# Patient Record
Sex: FEMALE | Race: WHITE | NOT HISPANIC OR LATINO | Employment: FULL TIME | URBAN - NONMETROPOLITAN AREA
[De-identification: names, ages, dates, MRNs, and addresses within clinical notes are randomized per-mention and may not be internally consistent; named-entity substitution may affect disease eponyms.]

---

## 2017-07-27 ENCOUNTER — APPOINTMENT (OUTPATIENT)
Dept: OCCUPATIONAL MEDICINE | Facility: OTHER | Age: 52
End: 2017-07-27

## 2017-07-27 PROCEDURE — 92552 PURE TONE AUDIOMETRY AIR: CPT

## 2020-07-14 ENCOUNTER — HOSPITAL ENCOUNTER (EMERGENCY)
Facility: HOSPITAL | Age: 55
End: 2020-07-14
Payer: COMMERCIAL

## 2020-07-14 ENCOUNTER — HOSPITAL ENCOUNTER (EMERGENCY)
Facility: HOSPITAL | Age: 55
Discharge: HOME | End: 2020-07-14
Attending: NURSE PRACTITIONER | Admitting: NURSE PRACTITIONER
Payer: COMMERCIAL

## 2020-07-14 VITALS
TEMPERATURE: 98 F | SYSTOLIC BLOOD PRESSURE: 190 MMHG | DIASTOLIC BLOOD PRESSURE: 107 MMHG | RESPIRATION RATE: 20 BRPM | OXYGEN SATURATION: 97 %

## 2020-07-14 DIAGNOSIS — K04.7 DENTAL ABSCESS: Primary | ICD-10-CM

## 2020-07-14 PROCEDURE — G0463 HOSPITAL OUTPT CLINIC VISIT: HCPCS

## 2020-07-14 PROCEDURE — 99203 OFFICE O/P NEW LOW 30 MIN: CPT | Mod: Z6 | Performed by: NURSE PRACTITIONER

## 2020-07-14 ASSESSMENT — ENCOUNTER SYMPTOMS: FACIAL SWELLING: 1

## 2020-07-14 NOTE — ED TRIAGE NOTES
"Pt is here with c/o dental pain \" I  have an abscess tooth\"  Onset 2 days   pt has not tried calling any dentist \" I g=figured Id get the abx and then get in there\"   ibu throught the days of only 400mg   Last dose 3 pm today   "

## 2020-07-14 NOTE — DISCHARGE INSTRUCTIONS
Take antibiotic as prescribed until finished.  Do salt water rinses and continue taking ibuprofen or Tylenol as needed for pain.  Apply cold compresses to the affected side of your face.    Schedule an appointment with a dentist as soon as possible for evaluation of fatigue.    Return to emergency department for worsening concerning symptoms.

## 2020-07-14 NOTE — ED AVS SNAPSHOT
[facility]  [address]  [place] [address]  Phone:  [phone]                                    Nhung Conroy   MRN: [id number]    Department:  [facility]   Date of Visit:  7/14/2020           After Visit Summary Signature Page    I have received my discharge instructions, and my questions have been answered. I have discussed any challenges I see with this plan with the nurse or doctor.    ..........................................................................................................................................  Patient/Patient Representative Signature      ..........................................................................................................................................  Patient Representative Print Name and Relationship to Patient    ..................................................               ................................................  Date                                   Time    ..........................................................................................................................................  Reviewed by Signature/Title    ...................................................              ..............................................  Date                                               Time          22EPIC Rev 08/18

## 2020-07-14 NOTE — ED PROVIDER NOTES
History     Chief Complaint   Patient presents with     Dental Pain     Bottom right     HPI  Nhung Conroy is a 55 year old female who presents to  for dental pain. Her right lower tooth cracked 1 week ago and noticed swelling to the right side of her face today.  Denies fever, nausea or vomiting. Swallowing okay.  She has been drinking more fluids and has tried ibuprofen which helps with the pain. Pain is 6/10. Last took ibuprofen around 1500.  She has not tried to contact the dentist yet.    Allergies:  No Known Allergies    Problem List:    There are no active problems to display for this patient.       Past Medical History:    History reviewed. No pertinent past medical history.    Past Surgical History:    History reviewed. No pertinent surgical history.    Family History:    No family history on file.    Social History:  Marital Status:   [2]  Social History     Tobacco Use     Smoking status: None   Substance Use Topics     Alcohol use: None     Drug use: None        Medications:    amoxicillin-clavulanate (AUGMENTIN) 875-125 MG tablet          Review of Systems   HENT: Positive for dental problem and facial swelling.    All other systems reviewed and are negative.      Physical Exam   BP: (!) 190/107  Heart Rate: 97  Temp: 98  F (36.7  C)  Resp: 20  SpO2: 97 %      Physical Exam  Vitals signs and nursing note reviewed.   Constitutional:       Appearance: Normal appearance. She is not ill-appearing or toxic-appearing.   HENT:      Head: Normocephalic.      Mouth/Throat:      Mouth: Mucous membranes are moist.      Dentition: Dental tenderness, gingival swelling and dental abscesses present.      Pharynx: Oropharynx is clear. Uvula midline.        Comments: Broken tooth #28. Right jaw swelling. No trismus.  Eyes:      Pupils: Pupils are equal, round, and reactive to light.   Neck:      Musculoskeletal: Neck supple.   Cardiovascular:      Rate and Rhythm: Normal rate and regular rhythm.       Heart sounds: Normal heart sounds.   Pulmonary:      Effort: Pulmonary effort is normal.      Breath sounds: Normal breath sounds.   Skin:     General: Skin is warm and dry.      Capillary Refill: Capillary refill takes less than 2 seconds.   Neurological:      Mental Status: She is alert and oriented to person, place, and time.         ED Course        Procedures               No results found for this or any previous visit (from the past 24 hour(s)).    Medications - No data to display    Assessments & Plan (with Medical Decision Making)   Patient has a right lower tooth that broke about a week ago with right facial swelling that developed today.  She does have dental tenderness, gingival swelling and broken tooth #28 along with a dental abscess.  No trismus.  Vital signs are stable.  Patient declines any pain medication.  Will prescribe Augmentin for the abscess.  Advised patient to do salt water gargles, take ibuprofen alternating with Tylenol and apply cold compresses to the affected side of the face.  Schedule an appointment with a dentist for further evaluation.  Return to emergency department for worsening concerning symptoms.  Patient verbalized understanding.    I have reviewed the nursing notes.    I have reviewed the findings, diagnosis, plan and need for follow up with the patient.      Discharge Medication List as of 7/14/2020  5:55 PM      START taking these medications    Details   amoxicillin-clavulanate (AUGMENTIN) 875-125 MG tablet Take 1 tablet by mouth 2 times daily for 7 days, Disp-14 tablet,R-0, E-Prescribe             Final diagnoses:   Dental abscess       7/14/2020   [facility]     Mpofu, Prudence, CNP  07/15/20 1507

## 2020-11-02 ENCOUNTER — NURSE TRIAGE (OUTPATIENT)
Dept: FAMILY MEDICINE | Facility: OTHER | Age: 55
End: 2020-11-02

## 2020-11-02 NOTE — TELEPHONE ENCOUNTER
Pt called, requesting covid testing. Pt is not experiencing symptoms and was not in direct contact with anyone with a confirmed positive for covid. Coworker tested positive though she is unsure which coworker. Was not within 6 feet of any coworkers and was wearing mask at all times. Pt informed that she does not meet criteria for testing at this time. Call back with symptoms or direct exposure. Pt verbalizes understanding.

## 2024-04-12 ENCOUNTER — HOSPITAL ENCOUNTER (EMERGENCY)
Facility: HOSPITAL | Age: 59
Discharge: HOME | End: 2024-04-12
Attending: NURSE PRACTITIONER | Admitting: NURSE PRACTITIONER
Payer: COMMERCIAL

## 2024-04-12 VITALS
DIASTOLIC BLOOD PRESSURE: 100 MMHG | HEART RATE: 91 BPM | SYSTOLIC BLOOD PRESSURE: 207 MMHG | OXYGEN SATURATION: 95 % | WEIGHT: 239 LBS | TEMPERATURE: 98.2 F | RESPIRATION RATE: 18 BRPM

## 2024-04-12 DIAGNOSIS — I10 ELEVATED BLOOD PRESSURE READING WITH DIAGNOSIS OF HYPERTENSION: ICD-10-CM

## 2024-04-12 LAB
ANION GAP SERPL CALCULATED.3IONS-SCNC: 9 MMOL/L (ref 7–15)
BASOPHILS # BLD AUTO: 0.1 10E3/UL (ref 0–0.2)
BASOPHILS NFR BLD AUTO: 1 %
BUN SERPL-MCNC: 14.7 MG/DL (ref 8–23)
CALCIUM SERPL-MCNC: 9.9 MG/DL (ref 8.8–10.2)
CHLORIDE SERPL-SCNC: 103 MMOL/L (ref 98–107)
CREAT SERPL-MCNC: 0.64 MG/DL (ref 0.51–0.95)
DEPRECATED HCO3 PLAS-SCNC: 27 MMOL/L (ref 22–29)
EGFRCR SERPLBLD CKD-EPI 2021: >90 ML/MIN/1.73M2
EOSINOPHIL # BLD AUTO: 0.1 10E3/UL (ref 0–0.7)
EOSINOPHIL NFR BLD AUTO: 1 %
ERYTHROCYTE [DISTWIDTH] IN BLOOD BY AUTOMATED COUNT: 13.9 % (ref 10–15)
GLUCOSE SERPL-MCNC: 101 MG/DL (ref 70–99)
HCT VFR BLD AUTO: 45.5 % (ref 35–47)
HGB BLD-MCNC: 15 G/DL (ref 11.7–15.7)
HOLD SPECIMEN: NORMAL
IMM GRANULOCYTES # BLD: 0 10E3/UL
IMM GRANULOCYTES NFR BLD: 0 %
LYMPHOCYTES # BLD AUTO: 1.5 10E3/UL (ref 0.8–5.3)
LYMPHOCYTES NFR BLD AUTO: 18 %
MCH RBC QN AUTO: 29.1 PG (ref 26.5–33)
MCHC RBC AUTO-ENTMCNC: 33 G/DL (ref 31.5–36.5)
MCV RBC AUTO: 88 FL (ref 78–100)
MONOCYTES # BLD AUTO: 0.6 10E3/UL (ref 0–1.3)
MONOCYTES NFR BLD AUTO: 7 %
NEUTROPHILS # BLD AUTO: 6 10E3/UL (ref 1.6–8.3)
NEUTROPHILS NFR BLD AUTO: 73 %
NRBC # BLD AUTO: 0 10E3/UL
NRBC BLD AUTO-RTO: 0 /100
PLATELET # BLD AUTO: 270 10E3/UL (ref 150–450)
POTASSIUM SERPL-SCNC: 4.5 MMOL/L (ref 3.4–5.3)
RBC # BLD AUTO: 5.15 10E6/UL (ref 3.8–5.2)
SODIUM SERPL-SCNC: 139 MMOL/L (ref 135–145)
TROPONIN T SERPL HS-MCNC: 9 NG/L
WBC # BLD AUTO: 8.3 10E3/UL (ref 4–11)

## 2024-04-12 PROCEDURE — 80048 BASIC METABOLIC PNL TOTAL CA: CPT | Performed by: NURSE PRACTITIONER

## 2024-04-12 PROCEDURE — 93010 ELECTROCARDIOGRAM REPORT: CPT | Performed by: INTERNAL MEDICINE

## 2024-04-12 PROCEDURE — 99284 EMERGENCY DEPT VISIT MOD MDM: CPT

## 2024-04-12 PROCEDURE — 85025 COMPLETE CBC W/AUTO DIFF WBC: CPT | Performed by: NURSE PRACTITIONER

## 2024-04-12 PROCEDURE — 93005 ELECTROCARDIOGRAM TRACING: CPT

## 2024-04-12 PROCEDURE — 99284 EMERGENCY DEPT VISIT MOD MDM: CPT | Performed by: NURSE PRACTITIONER

## 2024-04-12 PROCEDURE — 84484 ASSAY OF TROPONIN QUANT: CPT | Performed by: NURSE PRACTITIONER

## 2024-04-12 PROCEDURE — 36415 COLL VENOUS BLD VENIPUNCTURE: CPT | Performed by: NURSE PRACTITIONER

## 2024-04-12 ASSESSMENT — COLUMBIA-SUICIDE SEVERITY RATING SCALE - C-SSRS
2. HAVE YOU ACTUALLY HAD ANY THOUGHTS OF KILLING YOURSELF IN THE PAST MONTH?: NO
1. IN THE PAST MONTH, HAVE YOU WISHED YOU WERE DEAD OR WISHED YOU COULD GO TO SLEEP AND NOT WAKE UP?: NO
6. HAVE YOU EVER DONE ANYTHING, STARTED TO DO ANYTHING, OR PREPARED TO DO ANYTHING TO END YOUR LIFE?: NO

## 2024-04-12 NOTE — ED TRIAGE NOTES
Patient presents after being seen in the eye clinic and was told she has some inflammation of her cranial nerves. She states they took her B/P and it was high and she was directed to come here. Patient has no complaints, she's not dizzy, light headed, no balance issues. She takes no meds and does not see primary, states it's been years.

## 2024-04-12 NOTE — DISCHARGE INSTRUCTIONS
Follow-up with your primary care provider for reevaluation.  Contact your primary care provider if you have any questions or concerns.  Do not hesitate to return to the ER if any new or worsening symptoms.     Please read the attached instructions (if any).  They highlight more specific treatments and interventions for you at home.              Thank you for letting me participate in your care and wish you a fast and uneventful recovery,    Narinder LANG CNP    Do not hesitate to contact me with questions or concerns.  [email or web address]  [email or web address]

## 2024-04-12 NOTE — ED NOTES
Pt discharged home. Pt declined any headache, chest pain or pressure. Pt education given. Pt verbalized understanding of discharge instructions.

## 2024-04-12 NOTE — ED PROVIDER NOTES
History     Chief Complaint   Patient presents with    Hypertension     HPI  Nhung Conroy is a 58 year old individual is sent over from my clinic due to elevated blood pressure.  Patient  was seen at ophthalmology today and they took her blood pressure is elevated.  Was advised to go to the ER for evaluation.  Patient  is not having any symptoms.  No headache, visual disturbances, chest pain, shortness of breath, paresthesias, or weaknesses.   is feeling great.   does have history of high blood pressure at office visits in the past but has never taken any medication for it.    Allergies:  No Known Allergies    Problem List:    There are no problems to display for this patient.       Past Medical History:    History reviewed. No pertinent past medical history.    Past Surgical History:    History reviewed. No pertinent surgical history.    Family History:    History reviewed. No pertinent family history.    Social History:  Marital Status:   [2]  Social History     Tobacco Use    Smoking status: Every Day     Current packs/day: 1.00     Average packs/day: 1 pack/day for 30.1 years (30.1 ttl pk-yrs)     Types: Cigarettes     Start date: 3/7/1994    Smokeless tobacco: Never   Substance Use Topics    Alcohol use: Yes     Comment: monthly    Drug use: Never        Medications:    No current outpatient medications on file.        Review of Systems    Physical Exam   BP: (!) 207/100  Pulse: 91  Temp: 98.2  F (36.8  C)  Resp: 18  Weight: 108.4 kg (239 lb)  SpO2: 95 %      GENERAL APPEARANCE:  The patient is a 58 year old well-developed, well-nourished individual in no acute distress that appears as stated age.  NECK:  Supple.  Trachea is midline.  No carotid bruits present on auscultation.  LUNGS:  Breathing is easy.  Breath sounds are equal and clear bilaterally.  No wheezes, rhonchi, or rales.  HEART:  Regular rate and rhythm with normal S1 and S2.  No murmurs, gallops, or  rubs.  ABDOMEN:  No abdominal bruits or thrills present upon auscultation/palpation.  EXTREMITIES:  No cyanosis, clubbing, or edema.    NEUROLOGIC:  No focal sensory or motor deficits are noted.  Gait is normal.  Cranial nerves II through XII are intact.  Deep tendon reflexes are intact.  PSYCHIATRIC:  The patient is awake, alert, and oriented x4.  Recent and remote memory is intact.  Appropriate mood and affect.  Calm and cooperative with history and physical exam.  SKIN:  Warm, dry, and well perfused.  Good turgor.  No lesions, nodules, or rashes are noted.  No bruising noted.      Comment: Discrepancies between my note and notes on behalf of the nursing team or other care providers are secondary to my findings reflecting my physical examination and questioning of the patient.  Any conflicting information provided is not in line with my examination of the patient.       ED Course     ED Course as of 04/12/24 1748 Fri Apr 12, 2024   1347 Labs and ECG ordered while in [facility].   1743 EKG 12-lead, tracing only  No STEMI noted.   1743 Labs show no endorgan damage.  ECG normal.  Patient does have elevated blood pressure.  Will discharge patient home to follow-up with PCP in regards to blood pressure.  Return to ER if new or worsening symptoms do occur.  Patient in agreement.            ECG:    ECG competed at 1740 and personally reviewed at 1743 showing sinus rhythm with ventricular rate of 82 and QTc of 427.  Normal axis.  Normal ECG.  No previous ECG available for comparison.         Results for orders placed or performed during the hospital encounter of 04/12/24 (from the past 24 hour(s))   Basic metabolic panel   Result Value Ref Range    Sodium 139 135 - 145 mmol/L    Potassium 4.5 3.4 - 5.3 mmol/L    Chloride 103 98 - 107 mmol/L    Carbon Dioxide (CO2) 27 22 - 29 mmol/L    Anion Gap 9 7 - 15 mmol/L    Urea Nitrogen 14.7 8.0 - 23.0 mg/dL    Creatinine 0.64 0.51 - 0.95 mg/dL    GFR Estimate >90 >60 mL/min/1.73m2     Calcium 9.9 8.8 - 10.2 mg/dL    Glucose 101 (H) 70 - 99 mg/dL   CBC with platelets differential    Narrative    The following orders were created for panel order CBC with platelets differential.  Procedure                               Abnormality         Status                     ---------                               -----------         ------                     CBC with platelets and d...[[id number]]                      Final result               RBC and Platelet Morphology[[id number]]                                                   Please view results for these tests on the individual orders.   Troponin T, High Sensitivity   Result Value Ref Range    Troponin T, High Sensitivity 9 <=14 ng/L   CBC with platelets and differential   Result Value Ref Range    WBC Count 8.3 4.0 - 11.0 10e3/uL    RBC Count 5.15 3.80 - 5.20 10e6/uL    Hemoglobin 15.0 11.7 - 15.7 g/dL    Hematocrit 45.5 35.0 - 47.0 %    MCV 88 78 - 100 fL    MCH 29.1 26.5 - 33.0 pg    MCHC 33.0 31.5 - 36.5 g/dL    RDW 13.9 10.0 - 15.0 %    Platelet Count 270 150 - 450 10e3/uL    % Neutrophils 73 %    % Lymphocytes 18 %    % Monocytes 7 %    % Eosinophils 1 %    % Basophils 1 %    % Immature Granulocytes 0 %    NRBCs per 100 WBC 0 <1 /100    Absolute Neutrophils 6.0 1.6 - 8.3 10e3/uL    Absolute Lymphocytes 1.5 0.8 - 5.3 10e3/uL    Absolute Monocytes 0.6 0.0 - 1.3 10e3/uL    Absolute Eosinophils 0.1 0.0 - 0.7 10e3/uL    Absolute Basophils 0.1 0.0 - 0.2 10e3/uL    Absolute Immature Granulocytes 0.0 <=0.4 10e3/uL    Absolute NRBCs 0.0 10e3/uL   Extra Tube    Narrative    The following orders were created for panel order Extra Tube.  Procedure                               Abnormality         Status                     ---------                               -----------         ------                     Extra Blue Top Tube[[id number]]                              Final result               Extra Red Top Tube[[id number]]                                Final result               Extra Heparinized Syringe[[id number]]                        Final result                 Please view results for these tests on the individual orders.   Extra Blue Top Tube   Result Value Ref Range    Hold Specimen JIC    Extra Red Top Tube   Result Value Ref Range    Hold Specimen JIC    Extra Heparinized Syringe   Result Value Ref Range    Hold Specimen JIC    EKG 12-lead, tracing only   Result Value Ref Range    Systolic Blood Pressure  mmHg    Diastolic Blood Pressure  mmHg    Ventricular Rate 82 BPM    Atrial Rate 82 BPM    OH Interval 184 ms    QRS Duration 90 ms     ms    QTc 427 ms    P Axis 66 degrees    R AXIS 22 degrees    T Axis 69 degrees    Interpretation ECG       Sinus rhythm  Possible Left atrial enlargement  Borderline ECG  No previous ECGs available         Medications - No data to display    Assessments & Plan (with Medical Decision Making)     I have reviewed the nursing notes.    I have reviewed the findings, diagnosis, plan and need for follow up with the patient.      Summary:  Patient presents to the ER today for elevated blood pressure.  Potential diagnosis which have been considered and evaluated include hypertension, elevated blood pressure without diagnosis of hypertension, endorgan damage, as well as others. Many of these have been excluded using the various modalities and assessment as noted on the chart. At the present time, the diagnosis given seems to be the most likely blood pressure without diagnosis of hypertension.  Upon arrival, vitals signs show blood pressure 207/100 with a pulse of 91.  Temperature 36.8  C progress patient 16 laxation 95% on room air.  The patient is alert and oriented.  Neurological examination normal.  Cardiac and respiratory examination normal.  Patient has no symptoms.  ECG obtained showing no acute MIs.  Lab work obtained for checking of endorgan damage and shows WBC of 8.3 with hemoglobin of 15.0.  Electrolytes and  renal functions normal.  High-sensitivity troponin 9 making ACS unlikely.  At this time patient has elevated blood pressure reading without signs of endorgan damage.  Will discharge patient home to follow-up with PCP in regards to blood pressure control medication.  Return to ER if symptoms do occur such as chest pain, dizziness, lightheadedness, shortness of breath.  Patient verbalized understanding of plan of care.  Patient discharged home.        Critical Care Time: None    Impression and plan discussed with patient. Questions answered, concerns addressed, indications for urgent re-evaluation reviewed, and  given. Patient/Parent/Caregiver agree with treatment plan and have no further questions at this time.  AVS provided at discharge.    This note was created by the Dragon Voice Dictation System. Inadvertent typographical errors, due to software recognition problems, may still exist.             New Prescriptions    No medications on file       Final diagnoses:   Elevated blood pressure reading with diagnosis of hypertension       4/12/2024   [facility]       Narinder Martins APRN CNP  04/12/24 1827

## 2024-04-13 LAB
ATRIAL RATE - MUSE: 82 BPM
DIASTOLIC BLOOD PRESSURE - MUSE: NORMAL MMHG
INTERPRETATION ECG - MUSE: NORMAL
P AXIS - MUSE: 66 DEGREES
PR INTERVAL - MUSE: 184 MS
QRS DURATION - MUSE: 90 MS
QT - MUSE: 366 MS
QTC - MUSE: 427 MS
R AXIS - MUSE: 22 DEGREES
SYSTOLIC BLOOD PRESSURE - MUSE: NORMAL MMHG
T AXIS - MUSE: 69 DEGREES
VENTRICULAR RATE- MUSE: 82 BPM

## 2024-05-01 ENCOUNTER — OFFICE VISIT (OUTPATIENT)
Dept: FAMILY MEDICINE | Facility: OTHER | Age: 59
End: 2024-05-01
Attending: STUDENT IN AN ORGANIZED HEALTH CARE EDUCATION/TRAINING PROGRAM
Payer: COMMERCIAL

## 2024-05-01 ENCOUNTER — ANCILLARY PROCEDURE (OUTPATIENT)
Dept: GENERAL RADIOLOGY | Facility: OTHER | Age: 59
End: 2024-05-01
Attending: STUDENT IN AN ORGANIZED HEALTH CARE EDUCATION/TRAINING PROGRAM
Payer: COMMERCIAL

## 2024-05-01 VITALS
DIASTOLIC BLOOD PRESSURE: 90 MMHG | WEIGHT: 240.4 LBS | TEMPERATURE: 98 F | RESPIRATION RATE: 18 BRPM | BODY MASS INDEX: 42.59 KG/M2 | HEART RATE: 92 BPM | SYSTOLIC BLOOD PRESSURE: 180 MMHG | HEIGHT: 63 IN | OXYGEN SATURATION: 94 %

## 2024-05-01 DIAGNOSIS — M25.561 BILATERAL CHRONIC KNEE PAIN: ICD-10-CM

## 2024-05-01 DIAGNOSIS — G89.29 BILATERAL CHRONIC KNEE PAIN: ICD-10-CM

## 2024-05-01 DIAGNOSIS — H90.6 MIXED HEARING LOSS, BILATERAL: ICD-10-CM

## 2024-05-01 DIAGNOSIS — F17.210 CIGARETTE NICOTINE DEPENDENCE WITHOUT COMPLICATION: ICD-10-CM

## 2024-05-01 DIAGNOSIS — L24.5 IRRITANT CONTACT DERMATITIS DUE TO OTHER CHEMICAL PRODUCTS: ICD-10-CM

## 2024-05-01 DIAGNOSIS — I10 ESSENTIAL HYPERTENSION: ICD-10-CM

## 2024-05-01 DIAGNOSIS — M25.562 BILATERAL CHRONIC KNEE PAIN: ICD-10-CM

## 2024-05-01 DIAGNOSIS — Z76.89 ENCOUNTER TO ESTABLISH CARE: Primary | ICD-10-CM

## 2024-05-01 PROCEDURE — 99214 OFFICE O/P EST MOD 30 MIN: CPT | Performed by: STUDENT IN AN ORGANIZED HEALTH CARE EDUCATION/TRAINING PROGRAM

## 2024-05-01 PROCEDURE — 73562 X-RAY EXAM OF KNEE 3: CPT | Mod: TC | Performed by: RADIOLOGY

## 2024-05-01 RX ORDER — LISINOPRIL/HYDROCHLOROTHIAZIDE 10-12.5 MG
1 TABLET ORAL DAILY
Qty: 30 TABLET | Refills: 0 | Status: SHIPPED | OUTPATIENT
Start: 2024-05-01 | End: 2024-06-07

## 2024-05-01 RX ORDER — TRIAMCINOLONE ACETONIDE 1 MG/G
CREAM TOPICAL 2 TIMES DAILY
Qty: 30 G | Refills: 0 | Status: SHIPPED | OUTPATIENT
Start: 2024-05-01

## 2024-05-01 ASSESSMENT — ANXIETY QUESTIONNAIRES
GAD7 TOTAL SCORE: 12
1. FEELING NERVOUS, ANXIOUS, OR ON EDGE: MORE THAN HALF THE DAYS
5. BEING SO RESTLESS THAT IT IS HARD TO SIT STILL: NOT AT ALL
8. IF YOU CHECKED OFF ANY PROBLEMS, HOW DIFFICULT HAVE THESE MADE IT FOR YOU TO DO YOUR WORK, TAKE CARE OF THINGS AT HOME, OR GET ALONG WITH OTHER PEOPLE?: SOMEWHAT DIFFICULT
GAD7 TOTAL SCORE: 12
IF YOU CHECKED OFF ANY PROBLEMS ON THIS QUESTIONNAIRE, HOW DIFFICULT HAVE THESE PROBLEMS MADE IT FOR YOU TO DO YOUR WORK, TAKE CARE OF THINGS AT HOME, OR GET ALONG WITH OTHER PEOPLE: SOMEWHAT DIFFICULT
3. WORRYING TOO MUCH ABOUT DIFFERENT THINGS: NEARLY EVERY DAY
7. FEELING AFRAID AS IF SOMETHING AWFUL MIGHT HAPPEN: MORE THAN HALF THE DAYS
GAD7 TOTAL SCORE: 12
7. FEELING AFRAID AS IF SOMETHING AWFUL MIGHT HAPPEN: MORE THAN HALF THE DAYS
6. BECOMING EASILY ANNOYED OR IRRITABLE: SEVERAL DAYS
2. NOT BEING ABLE TO STOP OR CONTROL WORRYING: NEARLY EVERY DAY
4. TROUBLE RELAXING: SEVERAL DAYS

## 2024-05-01 ASSESSMENT — PAIN SCALES - GENERAL: PAINLEVEL: SEVERE PAIN (7)

## 2024-05-01 ASSESSMENT — PATIENT HEALTH QUESTIONNAIRE - PHQ9
SUM OF ALL RESPONSES TO PHQ QUESTIONS 1-9: 8
SUM OF ALL RESPONSES TO PHQ QUESTIONS 1-9: 8
10. IF YOU CHECKED OFF ANY PROBLEMS, HOW DIFFICULT HAVE THESE PROBLEMS MADE IT FOR YOU TO DO YOUR WORK, TAKE CARE OF THINGS AT HOME, OR GET ALONG WITH OTHER PEOPLE: SOMEWHAT DIFFICULT

## 2024-05-01 NOTE — PATIENT INSTRUCTIONS
Check blood pressure every morning and write it down  Bring blood pressure cuff and reading log to next visit  Watch sodium intake, limit to 2,000 mg a day  Try walking 30 minutes 5x a week

## 2024-05-01 NOTE — PROGRESS NOTES
Assessment & Plan     Encounter to establish care  Patient presents to clinic to establish care.  No PCP visit for several years.  Needs to come in to have labs updated.  Has uncontrolled HTN, first noted as bilateral papilledema which prompted referral to our clinic for further management of this.      Essential hypertension  HTN in severe range.  Will start with dual medication, low dose zestoretic.  Continue to monitor BP at home daily and bring log and BP machine to next visit  Discussed side effects of medicine.    Had ECG done, possible mild LAE- no exertional CP, SOB,palpitations, dizziness, lightheadedness, etc.   Reviewed DASH diet  Will benefit from weight loss.  Component of EMILE?  - lisinopril-hydrochlorothiazide (ZESTORETIC) 10-12.5 MG tablet; Take 1 tablet by mouth daily  - Home Blood Pressure Monitor Order for DME - ONLY FOR DME    Bilateral chronic knee pain  Most likely degenerative, will check XR first  May benefit from course of PT for strengthening  BMI of 42.58, weight contributing.  Recommend weight loss.  - XR Knee Right 3 Views (Clinic Performed); Future  - XR Knee Left 3 Views (Clinic Performed); Future    Irritant contact dermatitis due to other chemical products  Was doing crafts using resin epoxy and got exposed accidentally to the chemical on her arm and face- itchy and developing small blisters with scratching.   Bilateral forearms with scattered small papules on erythematous base  Will treat with triamcinolone cream BID  - triamcinolone (KENALOG) 0.1 % external cream; Apply topically 2 times daily Apply thin layer to rash only on bilateral forearms, hands and face twice daily for up to 14 days.    Mixed hearing loss, bilateral  - Adult Audiology  Referral; Future    Cigarette nicotine dependence without complication  Counseled on smoking cessation.  Will review in more detail at follow-up appointment.  Details not elucidated today      30 minutes spent by me on the date of  "the encounter doing chart review, history and exam, documentation and further activities per the note      Nicotine/Tobacco Cessation  She reports that she has been smoking cigarettes. She started smoking about 30 years ago. She has a 30.2 pack-year smoking history. She has never used smokeless tobacco.  Nicotine/Tobacco Cessation Plan  Self help information given to patient      BMI  Estimated body mass index is 42.58 kg/m  as calculated from the following:    Height as of this encounter: 1.6 m (5' 3\").    Weight as of this encounter: 109 kg (240 lb 6.4 oz).   Weight management plan: Discussed healthy diet and exercise guidelines      Return in about 2 weeks (around 5/15/2024).    Parris Hooker is a 58 year old, presenting for the following health issues:  Establish Care and Hypertension        5/1/2024     8:28 AM   Additional Questions   Roomed by Stefania Oscar LPN, CCP, MHP   Accompanied by self     History of Present Illness       Reason for visit:  High blood pressure  Symptom onset:  1-2 weeks ago    She eats 0-1 servings of fruits and vegetables daily.She consumes 0 sweetened beverage(s) daily.She exercises with enough effort to increase her heart rate 9 or less minutes per day.  She exercises with enough effort to increase her heart rate 3 or less days per week.   She is taking medications regularly.       ED/UC Followup:    Facility:  [facility]  Date of visit: 04/12/2024  Reason for visit: Hypertension  Current Status: has been checking it at home- patient can tell when bp is high she will get pressure In ears and optic nerves swell as per eye doctor     She tells me she had gone to eye doctor recently and her optic discs were noted to be swollen.  Was sent to the ER for evaluation.  Denies any headaches, vision changes, chest pain, SOB, palpitations, dizziness, lightheadedness, vision changes  Sometimes pressure in the ears and ringing.    Both parents had high blood pressure.  Has been told she has " "elevated BP for a while now but never did anything about it     Chronic knee pain.    Works at [employer] department at [employer]- feels that her knee pain is slowing her down a little bit.  Pain is waking her up at night.  She tells me her knees look swollen.  No other joint issues.      Right sided low back pain, radiating down posterior leg into knee and into ankle with walking.  Sitting helps. Weakness in the right leg thaty started last week, No foot drop.  No incontinence of stool or urine, no saddle anesthesia.      Was working with resin epoxy on Saturday- Had gloves and mask on but when cleaning up, she exposed her arms and face to it and has been itching it.        Review of Systems  Constitutional, HEENT, cardiovascular, pulmonary, GI, , musculoskeletal, neuro, skin, endocrine and psych systems are negative, except as otherwise noted.      Objective    BP (!) 180/90 (BP Location: Left arm, Patient Position: Sitting, Cuff Size: Adult Large)   Pulse 92   Temp 98  F (36.7  C) (Tympanic)   Resp 18   Ht 1.6 m (5' 3\")   Wt 109 kg (240 lb 6.4 oz)   SpO2 94%   BMI 42.58 kg/m    Body mass index is 42.58 kg/m .  Physical Exam   GENERAL: alert and no distress  EYES: Eyes grossly normal to inspection, PERRL and conjunctivae and sclerae normal  HENT: ear canals and TM's normal, nose and mouth without ulcers or lesions  NECK: no adenopathy, no asymmetry, masses, or scars  RESP: lungs clear to auscultation - no rales, rhonchi or wheezes  CV: regular rate and rhythm, normal S1 S2, no S3 or S4, no murmur, click or rub, no peripheral edema  ABDOMEN: soft, nontender, no hepatosplenomegaly, no masses and bowel sounds normal  MS: no gross musculoskeletal defects noted, no edema  SKIN: no suspicious lesions or rashes.  Palmar warts on bilateral palmar aspects of hand.    NEURO: Normal strength and tone, mentation intact and speech normal.  CN II- XII grossly intact.  Normal sensation, normal strength bilaterally, " Reflexes 2+ bilaterally  PSYCH: mentation appears normal, affect normal/bright          Signed Electronically by: Liv Ferrera MD

## 2024-05-02 ENCOUNTER — TELEPHONE (OUTPATIENT)
Dept: FAMILY MEDICINE | Facility: OTHER | Age: 59
End: 2024-05-02

## 2024-05-02 DIAGNOSIS — M25.562 BILATERAL CHRONIC KNEE PAIN: Primary | ICD-10-CM

## 2024-05-02 DIAGNOSIS — M25.561 BILATERAL CHRONIC KNEE PAIN: Primary | ICD-10-CM

## 2024-05-02 DIAGNOSIS — G89.29 BILATERAL CHRONIC KNEE PAIN: Primary | ICD-10-CM

## 2024-05-02 NOTE — TELEPHONE ENCOUNTER
Reason for call:  RESULTS    What is the test that was ordered? XR R knee  Who ordered your test? Dr Ferrera  When was the test performed?  5/1/24  Description: Patient requesting call back for results please    Preferred method for responding to this message: Telephone Call  What is your phone number ? [phone]    If we cannot reach you directly, may we leave a detailed response at the number you provided? Yes

## 2024-05-03 NOTE — TELEPHONE ENCOUNTER
Pt is agreeable to trying PT  Referral pended to PCP                Liv Ferrera MD  5/1/2024 10:24 AM CDT       There is some arthritis which is not severe in both knees.  Recommend course of PT for this for strengthening.  Can consider steroid injection once BP is well controlled.

## 2024-05-17 ENCOUNTER — OFFICE VISIT (OUTPATIENT)
Dept: FAMILY MEDICINE | Facility: OTHER | Age: 59
End: 2024-05-17
Attending: STUDENT IN AN ORGANIZED HEALTH CARE EDUCATION/TRAINING PROGRAM
Payer: COMMERCIAL

## 2024-05-17 ENCOUNTER — APPOINTMENT (OUTPATIENT)
Dept: LAB | Facility: OTHER | Age: 59
End: 2024-05-17
Payer: COMMERCIAL

## 2024-05-17 ENCOUNTER — ORDERS ONLY (AUTO-RELEASED) (OUTPATIENT)
Dept: FAMILY MEDICINE | Facility: OTHER | Age: 59
End: 2024-05-17

## 2024-05-17 VITALS
HEART RATE: 100 BPM | DIASTOLIC BLOOD PRESSURE: 81 MMHG | TEMPERATURE: 98.5 F | OXYGEN SATURATION: 96 % | RESPIRATION RATE: 18 BRPM | WEIGHT: 237.7 LBS | SYSTOLIC BLOOD PRESSURE: 132 MMHG | HEIGHT: 63 IN | BODY MASS INDEX: 42.12 KG/M2

## 2024-05-17 DIAGNOSIS — R60.0 BILATERAL LOWER EXTREMITY EDEMA: ICD-10-CM

## 2024-05-17 DIAGNOSIS — Z00.00 ROUTINE HISTORY AND PHYSICAL EXAMINATION OF ADULT: ICD-10-CM

## 2024-05-17 DIAGNOSIS — Z11.4 ENCOUNTER FOR SCREENING FOR HIV: ICD-10-CM

## 2024-05-17 DIAGNOSIS — F17.210 CIGARETTE NICOTINE DEPENDENCE WITHOUT COMPLICATION: ICD-10-CM

## 2024-05-17 DIAGNOSIS — Z12.31 ENCOUNTER FOR SCREENING MAMMOGRAM FOR BREAST CANCER: ICD-10-CM

## 2024-05-17 DIAGNOSIS — L73.2 HIDRADENITIS SUPPURATIVA: ICD-10-CM

## 2024-05-17 DIAGNOSIS — E55.9 VITAMIN D DEFICIENCY: ICD-10-CM

## 2024-05-17 DIAGNOSIS — G89.29 CHRONIC RIGHT-SIDED LOW BACK PAIN WITH RIGHT-SIDED SCIATICA: ICD-10-CM

## 2024-05-17 DIAGNOSIS — M17.0 OSTEOARTHRITIS OF BOTH KNEES, UNSPECIFIED OSTEOARTHRITIS TYPE: ICD-10-CM

## 2024-05-17 DIAGNOSIS — M54.41 CHRONIC RIGHT-SIDED LOW BACK PAIN WITH RIGHT-SIDED SCIATICA: ICD-10-CM

## 2024-05-17 DIAGNOSIS — E66.01 MORBID OBESITY (H): ICD-10-CM

## 2024-05-17 DIAGNOSIS — I10 ESSENTIAL HYPERTENSION: Primary | ICD-10-CM

## 2024-05-17 DIAGNOSIS — Z11.59 ENCOUNTER FOR HEPATITIS C SCREENING TEST FOR LOW RISK PATIENT: ICD-10-CM

## 2024-05-17 DIAGNOSIS — Z12.4 SCREENING FOR CERVICAL CANCER: ICD-10-CM

## 2024-05-17 LAB
ALBUMIN SERPL BCG-MCNC: 4.6 G/DL (ref 3.5–5.2)
ALP SERPL-CCNC: 86 U/L (ref 40–150)
ALT SERPL W P-5'-P-CCNC: 20 U/L (ref 0–50)
ANION GAP SERPL CALCULATED.3IONS-SCNC: 9 MMOL/L (ref 7–15)
AST SERPL W P-5'-P-CCNC: 17 U/L (ref 0–45)
BASOPHILS # BLD AUTO: 0 10E3/UL (ref 0–0.2)
BASOPHILS NFR BLD AUTO: 1 %
BILIRUB SERPL-MCNC: 0.5 MG/DL
BUN SERPL-MCNC: 23.1 MG/DL (ref 8–23)
CALCIUM SERPL-MCNC: 10.2 MG/DL (ref 8.8–10.2)
CHLORIDE SERPL-SCNC: 98 MMOL/L (ref 98–107)
CHOLEST SERPL-MCNC: 212 MG/DL
CREAT SERPL-MCNC: 0.69 MG/DL (ref 0.51–0.95)
CREAT UR-MCNC: 26.9 MG/DL
DEPRECATED HCO3 PLAS-SCNC: 28 MMOL/L (ref 22–29)
EGFRCR SERPLBLD CKD-EPI 2021: >90 ML/MIN/1.73M2
EOSINOPHIL # BLD AUTO: 0.1 10E3/UL (ref 0–0.7)
EOSINOPHIL NFR BLD AUTO: 1 %
ERYTHROCYTE [DISTWIDTH] IN BLOOD BY AUTOMATED COUNT: 14.2 % (ref 10–15)
EST. AVERAGE GLUCOSE BLD GHB EST-MCNC: 120 MG/DL
FASTING STATUS PATIENT QL REPORTED: YES
FASTING STATUS PATIENT QL REPORTED: YES
GLUCOSE SERPL-MCNC: 129 MG/DL (ref 70–99)
HBA1C MFR BLD: 5.8 %
HCT VFR BLD AUTO: 47.2 % (ref 35–47)
HDLC SERPL-MCNC: 55 MG/DL
HGB BLD-MCNC: 15.8 G/DL (ref 11.7–15.7)
IMM GRANULOCYTES # BLD: 0 10E3/UL
IMM GRANULOCYTES NFR BLD: 0 %
LDLC SERPL CALC-MCNC: 139 MG/DL
LYMPHOCYTES # BLD AUTO: 1.3 10E3/UL (ref 0.8–5.3)
LYMPHOCYTES NFR BLD AUTO: 16 %
MCH RBC QN AUTO: 29.2 PG (ref 26.5–33)
MCHC RBC AUTO-ENTMCNC: 33.5 G/DL (ref 31.5–36.5)
MCV RBC AUTO: 87 FL (ref 78–100)
MICROALBUMIN UR-MCNC: 34.7 MG/L
MICROALBUMIN/CREAT UR: 129 MG/G CR (ref 0–25)
MONOCYTES # BLD AUTO: 0.7 10E3/UL (ref 0–1.3)
MONOCYTES NFR BLD AUTO: 8 %
NEUTROPHILS # BLD AUTO: 6.3 10E3/UL (ref 1.6–8.3)
NEUTROPHILS NFR BLD AUTO: 74 %
NONHDLC SERPL-MCNC: 157 MG/DL
NRBC # BLD AUTO: 0 10E3/UL
NRBC BLD AUTO-RTO: 0 /100
PLATELET # BLD AUTO: 308 10E3/UL (ref 150–450)
POTASSIUM SERPL-SCNC: 4.1 MMOL/L (ref 3.4–5.3)
PROT SERPL-MCNC: 8.6 G/DL (ref 6.4–8.3)
RBC # BLD AUTO: 5.42 10E6/UL (ref 3.8–5.2)
SODIUM SERPL-SCNC: 135 MMOL/L (ref 135–145)
TRIGL SERPL-MCNC: 92 MG/DL
TSH SERPL DL<=0.005 MIU/L-ACNC: 0.91 UIU/ML (ref 0.3–4.2)
WBC # BLD AUTO: 8.5 10E3/UL (ref 4–11)

## 2024-05-17 PROCEDURE — 90471 IMMUNIZATION ADMIN: CPT | Performed by: STUDENT IN AN ORGANIZED HEALTH CARE EDUCATION/TRAINING PROGRAM

## 2024-05-17 PROCEDURE — 36415 COLL VENOUS BLD VENIPUNCTURE: CPT | Performed by: STUDENT IN AN ORGANIZED HEALTH CARE EDUCATION/TRAINING PROGRAM

## 2024-05-17 PROCEDURE — G0123 SCREEN CERV/VAG THIN LAYER: HCPCS | Performed by: STUDENT IN AN ORGANIZED HEALTH CARE EDUCATION/TRAINING PROGRAM

## 2024-05-17 PROCEDURE — 99213 OFFICE O/P EST LOW 20 MIN: CPT | Mod: 25 | Performed by: STUDENT IN AN ORGANIZED HEALTH CARE EDUCATION/TRAINING PROGRAM

## 2024-05-17 PROCEDURE — 87624 HPV HI-RISK TYP POOLED RSLT: CPT | Performed by: STUDENT IN AN ORGANIZED HEALTH CARE EDUCATION/TRAINING PROGRAM

## 2024-05-17 PROCEDURE — 80061 LIPID PANEL: CPT | Performed by: STUDENT IN AN ORGANIZED HEALTH CARE EDUCATION/TRAINING PROGRAM

## 2024-05-17 PROCEDURE — 90715 TDAP VACCINE 7 YRS/> IM: CPT | Performed by: STUDENT IN AN ORGANIZED HEALTH CARE EDUCATION/TRAINING PROGRAM

## 2024-05-17 PROCEDURE — 80050 GENERAL HEALTH PANEL: CPT | Performed by: STUDENT IN AN ORGANIZED HEALTH CARE EDUCATION/TRAINING PROGRAM

## 2024-05-17 PROCEDURE — 99396 PREV VISIT EST AGE 40-64: CPT | Mod: 25 | Performed by: STUDENT IN AN ORGANIZED HEALTH CARE EDUCATION/TRAINING PROGRAM

## 2024-05-17 PROCEDURE — 82043 UR ALBUMIN QUANTITATIVE: CPT | Performed by: STUDENT IN AN ORGANIZED HEALTH CARE EDUCATION/TRAINING PROGRAM

## 2024-05-17 PROCEDURE — 82570 ASSAY OF URINE CREATININE: CPT | Performed by: STUDENT IN AN ORGANIZED HEALTH CARE EDUCATION/TRAINING PROGRAM

## 2024-05-17 PROCEDURE — 82306 VITAMIN D 25 HYDROXY: CPT | Performed by: STUDENT IN AN ORGANIZED HEALTH CARE EDUCATION/TRAINING PROGRAM

## 2024-05-17 PROCEDURE — 86803 HEPATITIS C AB TEST: CPT | Performed by: STUDENT IN AN ORGANIZED HEALTH CARE EDUCATION/TRAINING PROGRAM

## 2024-05-17 PROCEDURE — 87389 HIV-1 AG W/HIV-1&-2 AB AG IA: CPT | Performed by: STUDENT IN AN ORGANIZED HEALTH CARE EDUCATION/TRAINING PROGRAM

## 2024-05-17 PROCEDURE — 83036 HEMOGLOBIN GLYCOSYLATED A1C: CPT | Performed by: STUDENT IN AN ORGANIZED HEALTH CARE EDUCATION/TRAINING PROGRAM

## 2024-05-17 RX ORDER — CLINDAMYCIN PHOSPHATE 10 MG/G
GEL TOPICAL
Qty: 60 G | Refills: 1 | Status: SHIPPED | OUTPATIENT
Start: 2024-05-17

## 2024-05-17 SDOH — HEALTH STABILITY: PHYSICAL HEALTH: ON AVERAGE, HOW MANY DAYS PER WEEK DO YOU ENGAGE IN MODERATE TO STRENUOUS EXERCISE (LIKE A BRISK WALK)?: 0 DAYS

## 2024-05-17 ASSESSMENT — ANXIETY QUESTIONNAIRES
6. BECOMING EASILY ANNOYED OR IRRITABLE: NOT AT ALL
5. BEING SO RESTLESS THAT IT IS HARD TO SIT STILL: NOT AT ALL
IF YOU CHECKED OFF ANY PROBLEMS ON THIS QUESTIONNAIRE, HOW DIFFICULT HAVE THESE PROBLEMS MADE IT FOR YOU TO DO YOUR WORK, TAKE CARE OF THINGS AT HOME, OR GET ALONG WITH OTHER PEOPLE: SOMEWHAT DIFFICULT
7. FEELING AFRAID AS IF SOMETHING AWFUL MIGHT HAPPEN: NEARLY EVERY DAY
2. NOT BEING ABLE TO STOP OR CONTROL WORRYING: NEARLY EVERY DAY
7. FEELING AFRAID AS IF SOMETHING AWFUL MIGHT HAPPEN: NEARLY EVERY DAY
1. FEELING NERVOUS, ANXIOUS, OR ON EDGE: NEARLY EVERY DAY
GAD7 TOTAL SCORE: 13
GAD7 TOTAL SCORE: 13
3. WORRYING TOO MUCH ABOUT DIFFERENT THINGS: NEARLY EVERY DAY
4. TROUBLE RELAXING: SEVERAL DAYS
GAD7 TOTAL SCORE: 13
8. IF YOU CHECKED OFF ANY PROBLEMS, HOW DIFFICULT HAVE THESE MADE IT FOR YOU TO DO YOUR WORK, TAKE CARE OF THINGS AT HOME, OR GET ALONG WITH OTHER PEOPLE?: SOMEWHAT DIFFICULT

## 2024-05-17 ASSESSMENT — SOCIAL DETERMINANTS OF HEALTH (SDOH): HOW OFTEN DO YOU GET TOGETHER WITH FRIENDS OR RELATIVES?: ONCE A WEEK

## 2024-05-17 ASSESSMENT — PATIENT HEALTH QUESTIONNAIRE - PHQ9
10. IF YOU CHECKED OFF ANY PROBLEMS, HOW DIFFICULT HAVE THESE PROBLEMS MADE IT FOR YOU TO DO YOUR WORK, TAKE CARE OF THINGS AT HOME, OR GET ALONG WITH OTHER PEOPLE: NOT DIFFICULT AT ALL
SUM OF ALL RESPONSES TO PHQ QUESTIONS 1-9: 6
SUM OF ALL RESPONSES TO PHQ QUESTIONS 1-9: 6

## 2024-05-17 ASSESSMENT — PAIN SCALES - GENERAL: PAINLEVEL: SEVERE PAIN (7)

## 2024-05-17 NOTE — PROGRESS NOTES
Preventive Care Visit  [facility]  Liv Ferrera MD, Family Medicine  May 17, 2024      Assessment & Plan     Routine history and physical examination of adult  Due for labs.    Declines immunizations today  - CBC with platelets and differential; Future  - Comprehensive metabolic panel; Future  - Hemoglobin A1c; Future  - Lipid Profile (Chol, Trig, HDL, LDL calc); Future  - TSH with free T4 reflex; Future  - Vitamin D Deficiency; Future  - Hepatitis C Screen Reflex to HCV RNA Quant and Genotype; Future  - HIV Antigen Antibody Combo; Future  - CT Chest Lung Cancer Scrn Low Dose wo; Future  - TDAP 7+ (ADACEL,BOOSTRIX)  - COLOGUARD([employer]); Future  - Gynecologic Cytology (PAP)  - CBC with platelets and differential  - Comprehensive metabolic panel  - Hemoglobin A1c  - Lipid Profile (Chol, Trig, HDL, LDL calc)  - TSH with free T4 reflex  - Vitamin D Deficiency  - Hepatitis C Screen Reflex to HCV RNA Quant and Genotype  - HIV Antigen Antibody Combo  - HPV Hold (Lab Only)    Essential hypertension  Due for updated labs.  BP okay today  Continue present management  - CBC with platelets and differential; Future  - Comprehensive metabolic panel; Future  - Hemoglobin A1c; Future  - Lipid Profile (Chol, Trig, HDL, LDL calc); Future  - TSH with free T4 reflex; Future  - HIV Antigen Antibody Combo; Future  - Albumin Random Urine Quantitative with Creat Ratio; Future  - CBC with platelets and differential  - Comprehensive metabolic panel  - Hemoglobin A1c  - Lipid Profile (Chol, Trig, HDL, LDL calc)  - TSH with free T4 reflex  - HIV Antigen Antibody Combo  - Albumin Random Urine Quantitative with Creat Ratio    Screening for cervical cancer  Due  - Gynecologic Cytology (Pap) and HPV (Ages 30-65); Future  - Gynecologic Cytology (Pap) and HPV (Ages 30-65)  - Gynecologic Cytology (PAP)  - HPV Hold (Lab Only)    Bilateral lower extremity edema  Resolved    Cigarette nicotine dependence without  complication  Discussed r/b/a.  Agrees to screening  - CT Chest Lung Cancer Scrn Low Dose wo; Future    Morbid obesity (H)  Due for labs.  Recommend lifestyle and dietary changes to promote weight loss  - Hemoglobin A1c; Future  - TSH with free T4 reflex; Future  - Hemoglobin A1c  - TSH with free T4 reflex    Osteoarthritis of both knees, unspecified osteoarthritis type  Stable    Chronic right-sided low back pain with right-sided sciatica  Progressive, will evaluate with MRI due to progressive nature of symptoms  - MR Lumbar Spine w/o Contrast; Future    Vitamin D deficiency  Pending  - Vitamin D Deficiency; Future  - Vitamin D Deficiency    Encounter for screening mammogram for breast cancer  Due  - MA SCREENING DIGITAL BILATERAL ([place]); Future    Encounter for hepatitis C screening test for low risk patient  Agrees to screening  - Hepatitis C Screen Reflex to HCV RNA Quant and Genotype; Future  - Hepatitis C Screen Reflex to HCV RNA Quant and Genotype    Encounter for screening for HIV  Agrees to screening    Hidradenitis suppurativa  Lesions consistent with HS.  Will treat with topical antibiotic  - clindamycin (CLEOCIN-T) 1 % external gel; Apply to rash between thighs and under breasts twice daily for up to 2 weeks at a time    Patient has been advised of split billing requirements and indicates understanding: No        Counseling  Appropriate preventive services were discussed with this patient, including applicable screening as appropriate for fall prevention, nutrition, physical activity, Tobacco-use cessation, weight loss and cognition.  Checklist reviewing preventive services available has been given to the patient.  Reviewed patient's diet, addressing concerns and/or questions.   The patient's PHQ-9 score is consistent with mild depression. She was provided with information regarding depression.         No follow-ups on file.    Parris Hooker is a 58 year old, presenting for the  following:  Physical        5/17/2024     8:10 AM   Additional Questions   Roomed by DARIA Tatum   Accompanied by self        Health Care Directive  Patient does not have a Health Care Directive or Living Will: Discussed advance care planning with patient; however, patient declined at this time.    HPI    Works in [employer] department, bending all day.  Can do her job but really needs to push herself.  This past month it has gotten worse.  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  Back has been an issue off and on for the last few years.  Back pain started about 2 years ago when mom passed away.  It would hurt and then get better.               5/17/2024   General Health   How would you rate your overall physical health? (!) FAIR   Feel stress (tense, anxious, or unable to sleep) Rather much   (!) STRESS CONCERN      5/17/2024   Nutrition   Three or more servings of calcium each day? Yes   Diet: I don't know   How many servings of fruit and vegetables per day? (!) 2-3   How many sweetened beverages each day? 0-1         5/17/2024   Exercise   Days per week of moderate/strenous exercise 0 days   (!) EXERCISE CONCERN      5/17/2024   Social Factors   Frequency of gathering with friends or relatives Once a week   Worry food won't last until get money to buy more No   Food not last or not have enough money for food? No   Do you have housing?  Yes   Are you worried about losing your housing? No   Lack of transportation? No   Unable to get utilities (heat,electricity)? No         5/17/2024   Fall Risk   Fallen 2 or more times in the past year? No   Trouble with walking or balance? Yes   Gait Speed Test (Document in seconds) 4.25   Gait Speed Test Interpretation Less than or equal to 5.00 seconds - PASS          5/17/2024   Dental   Dentist two times every year? (!) DECLINE         5/17/2024   TB Screening   Were you born outside of the [place]? Yes       Today's PHQ-9 Score:       5/17/2024     8:08  "AM   PHQ-9 SCORE   PHQ-9 Total Score MyChart 6 (Mild depression)   PHQ-9 Total Score 6         5/17/2024   Substance Use   Alcohol more than 3/day or more than 7/wk No   Do you use any other substances recreationally? No     Social History     Tobacco Use    Smoking status: Every Day     Current packs/day: 1.00     Average packs/day: 1 pack/day for 30.2 years (30.2 ttl pk-yrs)     Types: Cigarettes     Start date: 3/7/1994     Passive exposure: Current    Smokeless tobacco: Never   Substance Use Topics    Alcohol use: Yes     Comment: monthly    Drug use: Never          Mammogram Screening - Mammogram every 1-2 years updated in Health Maintenance based on mutual decision making          5/17/2024   One time HIV Screening   Previous HIV test? No         5/17/2024   STI Screening   New sexual partner(s) since last STI/HIV test? No     History of abnormal Pap smear: No - age 30- 64 PAP with HPV every 5 years recommended       ASCVD Risk   The ASCVD Risk score (Luc HURTADO, et al., 2019) failed to calculate for the following reasons:    Cannot find a previous HDL lab    Cannot find a previous total cholesterol lab      Reviewed and updated as needed this visit by Provider                    No past medical history on file.  No past surgical history on file.  OB History   No obstetric history on file.         Review of Systems  Constitutional, neuro, ENT, endocrine, pulmonary, cardiac, gastrointestinal, genitourinary, musculoskeletal, integument and psychiatric systems are negative, except as otherwise noted.     Objective    Exam  /81 (BP Location: Right arm, Patient Position: Sitting, Cuff Size: Adult Large)   Pulse 100   Temp 98.5  F (36.9  C) (Tympanic)   Resp 18   Ht 1.6 m (5' 3\")   Wt 107.8 kg (237 lb 11.2 oz)   SpO2 96%   BMI 42.11 kg/m     Estimated body mass index is 42.11 kg/m  as calculated from the following:    Height as of this encounter: 1.6 m (5' 3\").    Weight as of this encounter: " 107.8 kg (237 lb 11.2 oz).    Physical Exam  GENERAL: alert and no distress  EYES: Eyes grossly normal to inspection, PERRL and conjunctivae and sclerae normal  HENT: ear canals and TM's normal, nose and mouth without ulcers or lesions  NECK: no adenopathy, no asymmetry, masses, or scars  RESP: lungs clear to auscultation - no rales, rhonchi or wheezes  BREAST: normal without masses, tenderness or nipple discharge and no palpable axillary masses or adenopathy  CV: regular rate and rhythm, normal S1 S2, no S3 or S4, no murmur, click or rub, no peripheral edema  ABDOMEN: soft, nontender, no hepatosplenomegaly, no masses and bowel sounds normal   (female): normal female external genitalia, normal urethral meatus, normal vaginal mucosa  MS: no gross musculoskeletal defects noted, no edema  SKIN: no suspicious lesions or rashes  NEURO: Normal strength and tone, mentation intact and speech normal  PSYCH: mentation appears normal, affect normal/bright        Signed Electronically by: Liv Ferrera MD

## 2024-05-18 LAB
HCV AB SERPL QL IA: NONREACTIVE
HIV 1+2 AB+HIV1 P24 AG SERPL QL IA: NONREACTIVE
VIT D+METAB SERPL-MCNC: 19 NG/ML (ref 20–50)

## 2024-05-23 LAB
BKR LAB AP GYN ADEQUACY: NORMAL
BKR LAB AP GYN INTERPRETATION: NORMAL
BKR LAB AP GYN OTHER FINDINGS: NORMAL
BKR LAB AP HPV REFLEX: NORMAL
BKR LAB AP PREVIOUS ABNORMAL: NORMAL
PATH REPORT.COMMENTS IMP SPEC: NORMAL
PATH REPORT.COMMENTS IMP SPEC: NORMAL
PATH REPORT.RELEVANT HX SPEC: NORMAL

## 2024-05-24 ENCOUNTER — HOSPITAL ENCOUNTER (OUTPATIENT)
Dept: CT IMAGING | Facility: HOSPITAL | Age: 59
Discharge: HOME | End: 2024-05-24
Attending: STUDENT IN AN ORGANIZED HEALTH CARE EDUCATION/TRAINING PROGRAM
Payer: COMMERCIAL

## 2024-05-24 ENCOUNTER — HOSPITAL ENCOUNTER (OUTPATIENT)
Dept: MRI IMAGING | Facility: HOSPITAL | Age: 59
Discharge: HOME | End: 2024-05-24
Attending: STUDENT IN AN ORGANIZED HEALTH CARE EDUCATION/TRAINING PROGRAM
Payer: COMMERCIAL

## 2024-05-24 DIAGNOSIS — Z00.00 ROUTINE HISTORY AND PHYSICAL EXAMINATION OF ADULT: ICD-10-CM

## 2024-05-24 DIAGNOSIS — G89.29 CHRONIC RIGHT-SIDED LOW BACK PAIN WITH RIGHT-SIDED SCIATICA: ICD-10-CM

## 2024-05-24 DIAGNOSIS — M54.41 CHRONIC RIGHT-SIDED LOW BACK PAIN WITH RIGHT-SIDED SCIATICA: ICD-10-CM

## 2024-05-24 DIAGNOSIS — F17.210 CIGARETTE NICOTINE DEPENDENCE WITHOUT COMPLICATION: ICD-10-CM

## 2024-05-24 PROCEDURE — 71271 CT THORAX LUNG CANCER SCR C-: CPT

## 2024-05-24 PROCEDURE — 72148 MRI LUMBAR SPINE W/O DYE: CPT

## 2024-05-27 LAB
HPV HR 12 DNA CVX QL NAA+PROBE: NEGATIVE
HPV16 DNA CVX QL NAA+PROBE: NEGATIVE
HPV18 DNA CVX QL NAA+PROBE: NEGATIVE
HUMAN PAPILLOMA VIRUS FINAL DIAGNOSIS: NORMAL

## 2024-06-07 ENCOUNTER — OFFICE VISIT (OUTPATIENT)
Dept: FAMILY MEDICINE | Facility: OTHER | Age: 59
End: 2024-06-07
Attending: STUDENT IN AN ORGANIZED HEALTH CARE EDUCATION/TRAINING PROGRAM
Payer: COMMERCIAL

## 2024-06-07 VITALS
BODY MASS INDEX: 43.41 KG/M2 | SYSTOLIC BLOOD PRESSURE: 126 MMHG | OXYGEN SATURATION: 95 % | HEART RATE: 84 BPM | WEIGHT: 245 LBS | DIASTOLIC BLOOD PRESSURE: 84 MMHG | TEMPERATURE: 97.3 F | RESPIRATION RATE: 19 BRPM | HEIGHT: 63 IN

## 2024-06-07 DIAGNOSIS — M54.41 CHRONIC RIGHT-SIDED LOW BACK PAIN WITH RIGHT-SIDED SCIATICA: Primary | ICD-10-CM

## 2024-06-07 DIAGNOSIS — M17.0 OSTEOARTHRITIS OF BOTH KNEES, UNSPECIFIED OSTEOARTHRITIS TYPE: ICD-10-CM

## 2024-06-07 DIAGNOSIS — L73.2 HIDRADENITIS SUPPURATIVA: ICD-10-CM

## 2024-06-07 DIAGNOSIS — F17.210 CIGARETTE NICOTINE DEPENDENCE WITHOUT COMPLICATION: ICD-10-CM

## 2024-06-07 DIAGNOSIS — G89.29 CHRONIC RIGHT-SIDED LOW BACK PAIN WITH RIGHT-SIDED SCIATICA: Primary | ICD-10-CM

## 2024-06-07 DIAGNOSIS — E66.01 MORBID OBESITY (H): ICD-10-CM

## 2024-06-07 DIAGNOSIS — R60.0 BILATERAL LOWER EXTREMITY EDEMA: ICD-10-CM

## 2024-06-07 DIAGNOSIS — I10 ESSENTIAL HYPERTENSION: ICD-10-CM

## 2024-06-07 PROCEDURE — 99214 OFFICE O/P EST MOD 30 MIN: CPT | Performed by: STUDENT IN AN ORGANIZED HEALTH CARE EDUCATION/TRAINING PROGRAM

## 2024-06-07 RX ORDER — LISINOPRIL/HYDROCHLOROTHIAZIDE 10-12.5 MG
1 TABLET ORAL DAILY
Qty: 90 TABLET | Refills: 1 | Status: SHIPPED | OUTPATIENT
Start: 2024-06-07 | End: 2024-09-13 | Stop reason: DRUGHIGH

## 2024-06-07 ASSESSMENT — PAIN SCALES - GENERAL: PAINLEVEL: EXTREME PAIN (9)

## 2024-06-07 NOTE — PROGRESS NOTES
Assessment & Plan     Essential hypertension  BP in target range now.  Patient reports to me that she has been out of her medication for a week.  Emphasized importance of adhering to medication regimen to prevent rebound HTN.    She also tells me she is taking beet root supplement to help with her blood pressure.    She denies any dizziness, lightheadedness, or nausea with the BP medication and her home BP has been measuring normal on the medication  DASH diet  Continue home monitoring  RTC in 3 months  - lisinopril-hydrochlorothiazide (ZESTORETIC) 10-12.5 MG tablet; Take 1 tablet by mouth daily    Chronic right-sided low back pain with right-sided sciatica  MRI on 5/24 shows severe central spinal stenosis at L4-L5 with moderate  She would like to try injection first  May benefit from PT but should see OA for consultation to see if she is a surgical candidate  compression of both L4 and both L5 nerve roots  - Orthopedic  Referral; Future  - IR Consultation for IR Exam (Pain Consult); Future    Osteoarthritis of both knees, unspecified osteoarthritis type  Will be starting PT for this  Emphasizes smoking cessation  May benefit from knee injection now that her BP is under control    Morbid obesity (H)  Reviewed lifestyle and dietary changes to promote weight loss  At risk for EMILE/obesity hypoventilation syndrome by BMI alone  Consider sleep study    Bilateral lower extremity edema  Improved on zestoretic.  No S/S of HF    Hidradenitis suppurativa  Improved with topical clinda    Cigarette nicotine dependence without complication  Down from 1 PPD to 1/2 PPD  Agrees to start nicotine patch, chew gum to occupy mouth/distraction  - nicotine (NICODERM CQ) 7 MG/24HR 24 hr patch; Place 1 patch onto the skin every 24 hours        Return in about 3 months (around 9/7/2024) for Follow up.    Subjective   Nhung is a 58 year old, presenting for the following health issues:  Hypertension and Back Pain (/)         "6/7/2024     8:36 AM   Additional Questions   Roomed by DARIA Tatum   Accompanied by self`     History of Present Illness       Back Pain:  She presents for follow up of back pain. Patient's back pain is a chronic problem.  Location of back pain:  Right lower back  Description of back pain: other  Back pain spreads: right buttocks and right thigh    Since patient first noticed back pain, pain is: unchanged  Does back pain interfere with her job:  Yes       Hypertension: She presents for follow up of hypertension.  She does check blood pressure  regularly outside of the clinic. Outpatient blood pressures have not been over 140/90. She follows a low salt diet.     She eats 2-3 servings of fruits and vegetables daily.She consumes 1 sweetened beverage(s) daily.She exercises with enough effort to increase her heart rate 9 or less minutes per day.  She exercises with enough effort to increase her heart rate 3 or less days per week.   She is taking medications regularly.           Objective    /84 (BP Location: Left arm, Patient Position: Sitting, Cuff Size: Adult Large)   Pulse 84   Temp 97.3  F (36.3  C) (Tympanic)   Resp 19   Ht 1.6 m (5' 3\")   Wt 111.1 kg (245 lb)   SpO2 95%   BMI 43.40 kg/m    Body mass index is 43.4 kg/m .  Physical Exam   GENERAL: alert and no distress  EYES: Eyes grossly normal to inspection, PERRL and conjunctivae and sclerae normal  HENT: ear canals and TM's normal, nose and mouth without ulcers or lesions  NECK: no adenopathy, no asymmetry, masses, or scars  RESP: lungs clear to auscultation - low pitched trace rales  CV: regular rate and rhythm, normal S1 S2, no S3 or S4, no murmur, click or rub, trace peripheral edema LE bilaterally  MS: no gross musculoskeletal defects noted, no edema  SKIN: no suspicious lesions or rashes  NEURO: Normal strength and tone, mentation intact and speech normal  PSYCH: mentation appears normal, affect normal/bright          Signed Electronically " by: Liv Ferrera MD

## 2024-06-10 ENCOUNTER — TELEPHONE (OUTPATIENT)
Dept: INTERVENTIONAL RADIOLOGY/VASCULAR | Facility: HOSPITAL | Age: 59
End: 2024-06-10

## 2024-06-12 ENCOUNTER — TELEPHONE (OUTPATIENT)
Dept: FAMILY MEDICINE | Facility: OTHER | Age: 59
End: 2024-06-12

## 2024-06-12 ENCOUNTER — TELEPHONE (OUTPATIENT)
Dept: CARE COORDINATION | Facility: OTHER | Age: 59
End: 2024-06-12

## 2024-06-12 DIAGNOSIS — M17.0 OSTEOARTHRITIS OF BOTH KNEES, UNSPECIFIED OSTEOARTHRITIS TYPE: Primary | ICD-10-CM

## 2024-06-12 NOTE — TELEPHONE ENCOUNTER
3:51 PM    Reason for Call: Phone Call    Description: Shannon from [employer] called about a needing ICD diagnosis code for they are able to send out cologuard. Please call Shannon with case number  [id number]    Was an appointment offered for this call? No  If yes : Appointment type              Date    Preferred method for responding to this message: Telephone Call  What is your phone number ? [phone]    If we cannot reach you directly, may we leave a detailed response at the number you provided? No    Can this message wait until your PCP/provider returns, if available today? Guerline Escalera

## 2024-06-12 NOTE — TELEPHONE ENCOUNTER
Carlota Torres  Family Care Team 2  Please have Dr Ferrera put an internal referral for bilateral knee injections to Dr Lowe. Her appt is Tues 6/18/24. Thank you! The current referral is for back only. He doesn't see for backs.

## 2024-06-12 NOTE — TELEPHONE ENCOUNTER
[employer] order needs additional diagnosis codes  Writer called customer service & updated  Order is now ready to ship to the patient  No further concerns at calls end.  Phone call ended pleasantly.

## 2024-06-13 NOTE — TELEPHONE ENCOUNTER
Teed up Referral to Ortho for PCP to review  Patient has audra't with Dr. Lowe set for 6/18 for bilat knee injections

## 2024-06-14 ENCOUNTER — THERAPY VISIT (OUTPATIENT)
Dept: PHYSICAL THERAPY | Facility: HOSPITAL | Age: 59
End: 2024-06-14
Attending: STUDENT IN AN ORGANIZED HEALTH CARE EDUCATION/TRAINING PROGRAM
Payer: COMMERCIAL

## 2024-06-14 DIAGNOSIS — G89.29 BILATERAL CHRONIC KNEE PAIN: ICD-10-CM

## 2024-06-14 DIAGNOSIS — M25.561 BILATERAL CHRONIC KNEE PAIN: ICD-10-CM

## 2024-06-14 DIAGNOSIS — M25.562 BILATERAL CHRONIC KNEE PAIN: ICD-10-CM

## 2024-06-14 PROCEDURE — 97110 THERAPEUTIC EXERCISES: CPT | Mod: GP

## 2024-06-14 PROCEDURE — 97161 PT EVAL LOW COMPLEX 20 MIN: CPT | Mod: GP

## 2024-06-14 ASSESSMENT — ACTIVITIES OF DAILY LIVING (ADL)
PAIN: THE SYMPTOM PREVENTS ME FROM ALL DAILY ACTIVITIES
RISE FROM A CHAIR: ACTIVITY IS FAIRLY DIFFICULT
SWELLING: THE SYMPTOM AFFECTS MY ACTIVITY MODERATELY
KNEE_ACTIVITY_OF_DAILY_LIVING_SUM: 30
AS_A_RESULT_OF_YOUR_KNEE_INJURY,_HOW_WOULD_YOU_RATE_YOUR_CURRENT_LEVEL_OF_DAILY_ACTIVITY?: ABNORMAL
GO UP STAIRS: ACTIVITY IS VERY DIFFICULT
HOW_WOULD_YOU_RATE_THE_OVERALL_FUNCTION_OF_YOUR_KNEE_DURING_YOUR_USUAL_DAILY_ACTIVITIES?: ABNORMAL
GO DOWN STAIRS: ACTIVITY IS SOMEWHAT DIFFICULT
SQUAT: ACTIVITY IS SOMEWHAT DIFFICULT
WEAKNESS: THE SYMPTOM PREVENTS ME FROM ALL DAILY ACTIVITIES
GO DOWN STAIRS: ACTIVITY IS SOMEWHAT DIFFICULT
LIMPING: THE SYMPTOM AFFECTS MY ACTIVITY MODERATELY
SWELLING: THE SYMPTOM AFFECTS MY ACTIVITY MODERATELY
RAW_SCORE: 30
WEAKNESS: THE SYMPTOM PREVENTS ME FROM ALL DAILY ACTIVITIES
WALK: ACTIVITY IS VERY DIFFICULT
GIVING WAY, BUCKLING OR SHIFTING OF KNEE: THE SYMPTOM AFFECTS MY ACTIVITY MODERATELY
AS_A_RESULT_OF_YOUR_KNEE_INJURY,_HOW_WOULD_YOU_RATE_YOUR_CURRENT_LEVEL_OF_DAILY_ACTIVITY?: ABNORMAL
SIT WITH YOUR KNEE BENT: ACTIVITY IS MINIMALLY DIFFICULT
HOW_WOULD_YOU_RATE_THE_CURRENT_FUNCTION_OF_YOUR_KNEE_DURING_YOUR_USUAL_DAILY_ACTIVITIES_ON_A_SCALE_FROM_0_TO_100_WITH_100_BEING_YOUR_LEVEL_OF_KNEE_FUNCTION_PRIOR_TO_YOUR_INJURY_AND_0_BEING_THE_INABILITY_TO_PERFORM_ANY_OF_YOUR_USUAL_DAILY_ACTIVITIES?: 20
WALK: ACTIVITY IS VERY DIFFICULT
SIT WITH YOUR KNEE BENT: ACTIVITY IS MINIMALLY DIFFICULT
WEAKNESS: THE SYMPTOM PREVENTS ME FROM ALL DAILY ACTIVITIES
SQUAT: ACTIVITY IS SOMEWHAT DIFFICULT
LIMPING: THE SYMPTOM AFFECTS MY ACTIVITY MODERATELY
KNEEL ON THE FRONT OF YOUR KNEE: ACTIVITY IS VERY DIFFICULT
HOW_WOULD_YOU_RATE_THE_OVERALL_FUNCTION_OF_YOUR_KNEE_DURING_YOUR_USUAL_DAILY_ACTIVITIES?: ABNORMAL
HOW_WOULD_YOU_RATE_THE_OVERALL_FUNCTION_OF_YOUR_KNEE_DURING_YOUR_USUAL_DAILY_ACTIVITIES?: ABNORMAL
STIFFNESS: I DO NOT HAVE THE SYMPTOM
RISE FROM A CHAIR: ACTIVITY IS FAIRLY DIFFICULT
STAND: ACTIVITY IS MINIMALLY DIFFICULT
SWELLING: THE SYMPTOM AFFECTS MY ACTIVITY MODERATELY
GO UP STAIRS: ACTIVITY IS VERY DIFFICULT
RISE FROM A CHAIR: ACTIVITY IS FAIRLY DIFFICULT
STIFFNESS: I DO NOT HAVE THE SYMPTOM
KNEE_ACTIVITY_OF_DAILY_LIVING_SCORE: 42.86
HOW_WOULD_YOU_RATE_THE_CURRENT_FUNCTION_OF_YOUR_KNEE_DURING_YOUR_USUAL_DAILY_ACTIVITIES_ON_A_SCALE_FROM_0_TO_100_WITH_100_BEING_YOUR_LEVEL_OF_KNEE_FUNCTION_PRIOR_TO_YOUR_INJURY_AND_0_BEING_THE_INABILITY_TO_PERFORM_ANY_OF_YOUR_USUAL_DAILY_ACTIVITIES?: 20
WALK: ACTIVITY IS VERY DIFFICULT
SIT WITH YOUR KNEE BENT: ACTIVITY IS MINIMALLY DIFFICULT
STAND: ACTIVITY IS MINIMALLY DIFFICULT
PAIN: THE SYMPTOM PREVENTS ME FROM ALL DAILY ACTIVITIES
KNEE_ACTIVITY_OF_DAILY_LIVING_SUM: 30
GO DOWN STAIRS: ACTIVITY IS SOMEWHAT DIFFICULT
KNEE_ACTIVITY_OF_DAILY_LIVING_SCORE: 42.86
GIVING WAY, BUCKLING OR SHIFTING OF KNEE: THE SYMPTOM AFFECTS MY ACTIVITY MODERATELY
GO UP STAIRS: ACTIVITY IS VERY DIFFICULT
RAW_SCORE: 30
KNEEL ON THE FRONT OF YOUR KNEE: ACTIVITY IS VERY DIFFICULT
GIVING WAY, BUCKLING OR SHIFTING OF KNEE: THE SYMPTOM AFFECTS MY ACTIVITY MODERATELY
STIFFNESS: I DO NOT HAVE THE SYMPTOM
KNEEL ON THE FRONT OF YOUR KNEE: ACTIVITY IS VERY DIFFICULT
AS_A_RESULT_OF_YOUR_KNEE_INJURY,_HOW_WOULD_YOU_RATE_YOUR_CURRENT_LEVEL_OF_DAILY_ACTIVITY?: ABNORMAL
LIMPING: THE SYMPTOM AFFECTS MY ACTIVITY MODERATELY
STAND: ACTIVITY IS MINIMALLY DIFFICULT
SQUAT: ACTIVITY IS SOMEWHAT DIFFICULT
PAIN: THE SYMPTOM PREVENTS ME FROM ALL DAILY ACTIVITIES

## 2024-06-14 NOTE — PROGRESS NOTES
PHYSICAL THERAPY EVALUATION  Type of Visit: Evaluation    Subjective       Presenting condition or subjective complaint: Pt presents to PT with c/o bilateral knee pain. She has had pain for about 3 years.  The left knee has been feeling like it will give out. She has bad pain under left knee cap. Also has swelling by the end of the day. She is getting injections next Tuesday. She is not able to walk more than a block due to knee pain. 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  Hobbies/Interests: crafting    Patient goals for therapy: walk normal    Pain assessment: Pain present    Knee ADLs Score: 42.8     Objective   KNEE EVALUATION  PAIN: Pain Level at Rest: 0/10  Pain Level with Use: 8/10  Pain Location: knee and    Pain Quality: Aching  Pain Frequency: intermittent  Pain is Worst: daytime  Pain is Exacerbated By: Daily activities, walking  Pain is Relieved By: heat  Pain Progression: Worsened  INTEGUMENTARY (edema, incisions):   POSTURE: Standing Posture: Rounded shoulders  GAIT:  Weightbearing Status: WBAT  Assistive Device(s):   Gait Deviations: Antalgic  Base of support increased  Stride length decreased  Sharon decreased  BALANCE/PROPRIOCEPTION:  Less than  5 sec unsupported bilaterally  WEIGHTBEARING ALIGNMENT: Knee WB Alignment:Genu varus L, Genu varus R  NON-WEIGHTBEARING ALIGNMENT: WFL  ROM:  R knee 120, L knee 110    STRENGTH:  BLE strength: Hip abduction 4/5, quads 4-/5, HS 4/5  FLEXIBILITY: WFL  SPECIAL TESTS:   FUNCTIONAL TESTS: Double Leg Squat: Anterior knee translation, Knee valgus, Hip internal rotation, and Improper use of glutes/hips  Single Leg Squat: Anterior knee translation, Knee valgus, Hip internal rotation, and Improper use of glutes/hips  PALPATION:       Assessment & Plan   CLINICAL IMPRESSIONS  Medical Diagnosis: Bilateral chronic knee pain    Treatment Diagnosis: Bilateral chronic knee pain   Impression/Assessment: Patient is a 58 year old female with bilateral knee pain complaints.  The following significant findings have been identified: Pain, Decreased ROM/flexibility, Decreased joint mobility, Decreased strength, Impaired gait, Impaired muscle performance, and Decreased activity tolerance. These impairments interfere with their ability to perform self care tasks, work tasks, recreational activities, household chores, household mobility, and community mobility as compared to previous level of function.     Clinical Decision Making (Complexity):  Clinical Presentation: Stable/Uncomplicated  Clinical Presentation Rationale: based on medical and personal factors listed in PT evaluation  Clinical Decision Making (Complexity): Low complexity    PLAN OF CARE  Treatment Interventions:  Modalities: E-stim, Hot Pack, Ultrasound  Interventions: Gait Training, Manual Therapy, Neuromuscular Re-education, Therapeutic Activity, Therapeutic Exercise    Long Term Goals     PT Goal 1  Goal Identifier: STG 1  Goal Description: Pt will demonstrate knowledge/understanding of HEP and report daily compliance  Target Date: 06/28/24  PT Goal 2  Goal Identifier: LTG 1  Goal Description: Pt will ambulate for 20 minutes without requiring seated rest break due to bilateral knee pain  Rationale: to maximize safety and independence within the community  Target Date: 08/23/24  PT Goal 3  Goal Identifier: LTG 2  Goal Description: Pt  will complete daily activities without knee pain exceeding 4/10  Rationale: to maximize safety and independence with performance of ADLs and functional tasks  Target Date: 08/23/24      Frequency of Treatment: 1-2x/week  Duration of Treatment: up to 10 weeks    Recommended Referrals to Other Professionals:   Education Assessment:   Learner/Method: Patient  Education Comments: HEP    Risks and benefits of evaluation/treatment have been explained.   Patient/Family/caregiver agrees with Plan of Care.     Evaluation Time:     PT Eval, Low Complexity Minutes (36439): 15     Signing Clinician: Naheed Batista PT

## 2024-06-18 ENCOUNTER — OFFICE VISIT (OUTPATIENT)
Dept: ORTHOPEDICS | Facility: OTHER | Age: 59
End: 2024-06-18
Attending: STUDENT IN AN ORGANIZED HEALTH CARE EDUCATION/TRAINING PROGRAM
Payer: COMMERCIAL

## 2024-06-18 VITALS
OXYGEN SATURATION: 94 % | RESPIRATION RATE: 16 BRPM | WEIGHT: 230 LBS | SYSTOLIC BLOOD PRESSURE: 138 MMHG | HEIGHT: 63 IN | DIASTOLIC BLOOD PRESSURE: 84 MMHG | TEMPERATURE: 97.4 F | BODY MASS INDEX: 40.75 KG/M2 | HEART RATE: 88 BPM

## 2024-06-18 DIAGNOSIS — M54.41 CHRONIC RIGHT-SIDED LOW BACK PAIN WITH RIGHT-SIDED SCIATICA: ICD-10-CM

## 2024-06-18 DIAGNOSIS — G89.29 CHRONIC RIGHT-SIDED LOW BACK PAIN WITH RIGHT-SIDED SCIATICA: ICD-10-CM

## 2024-06-18 DIAGNOSIS — M17.12 PRIMARY LOCALIZED OSTEOARTHROSIS OF LEFT LOWER LEG: ICD-10-CM

## 2024-06-18 DIAGNOSIS — M17.11 ARTHRITIS OF RIGHT KNEE: Primary | ICD-10-CM

## 2024-06-18 PROCEDURE — 20610 DRAIN/INJ JOINT/BURSA W/O US: CPT | Mod: LT | Performed by: ORTHOPAEDIC SURGERY

## 2024-06-18 PROCEDURE — 99203 OFFICE O/P NEW LOW 30 MIN: CPT | Mod: 25 | Performed by: ORTHOPAEDIC SURGERY

## 2024-06-18 PROCEDURE — 20610 DRAIN/INJ JOINT/BURSA W/O US: CPT | Mod: RT | Performed by: ORTHOPAEDIC SURGERY

## 2024-06-18 RX ORDER — BETAMETHASONE SODIUM PHOSPHATE AND BETAMETHASONE ACETATE 3; 3 MG/ML; MG/ML
12 INJECTION, SUSPENSION INTRA-ARTICULAR; INTRALESIONAL; INTRAMUSCULAR; SOFT TISSUE ONCE
Status: COMPLETED | OUTPATIENT
Start: 2024-06-18 | End: 2024-06-18

## 2024-06-18 RX ORDER — LIDOCAINE HYDROCHLORIDE 10 MG/ML
1 INJECTION, SOLUTION EPIDURAL; INFILTRATION; INTRACAUDAL; PERINEURAL ONCE
Status: COMPLETED | OUTPATIENT
Start: 2024-06-18 | End: 2024-06-18

## 2024-06-18 RX ORDER — LIDOCAINE HYDROCHLORIDE 10 MG/ML
1 INJECTION, SOLUTION INFILTRATION; PERINEURAL ONCE
Status: COMPLETED | OUTPATIENT
Start: 2024-06-18 | End: 2024-06-18

## 2024-06-18 RX ADMIN — BETAMETHASONE SODIUM PHOSPHATE AND BETAMETHASONE ACETATE 12 MG: 3; 3 INJECTION, SUSPENSION INTRA-ARTICULAR; INTRALESIONAL; INTRAMUSCULAR; SOFT TISSUE at 13:58

## 2024-06-18 RX ADMIN — LIDOCAINE HYDROCHLORIDE 1 ML: 10 INJECTION, SOLUTION INFILTRATION; PERINEURAL at 13:58

## 2024-06-18 RX ADMIN — LIDOCAINE HYDROCHLORIDE 1 ML: 10 INJECTION, SOLUTION EPIDURAL; INFILTRATION; INTRACAUDAL; PERINEURAL at 13:58

## 2024-06-18 ASSESSMENT — PAIN SCALES - GENERAL: PAINLEVEL: EXTREME PAIN (8)

## 2024-06-18 NOTE — PROGRESS NOTES
ORTHOPEDIC CLINIC CONSULT    Referred by:     Chief Complaint: Nhung Conroy is a 58 year old female who is being seen for   Chief Complaint   Patient presents with    Musculoskeletal Problem     Bilateral Knee Pain       History of Present Illness:   Patient is a 58-year-old female who presents with bilateral knee pain.  She notes that she has had 3 years plus history of ongoing knee pain along the medial aspect and medial joint line.  Left is worse than right symptom wise.  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  She denies any nighttime pain awakening except for occasional.  She presents for evaluation.  She had been started on some physical therapy but has not had any injections or other treatment other than over-the-counter occasional anti-inflammatories or Tylenol.  She presents discussed the findings and recommendations.  She has has had x-rays on both knees.    Patient's past medical, surgical, social and family histories reviewed.     History reviewed. No pertinent past medical history.    History reviewed. No pertinent surgical history.    Home Medications:  Prior to Admission medications    Medication Sig Start Date End Date Taking? Authorizing Provider   clindamycin (CLEOCIN-T) 1 % external gel Apply to rash between thighs and under breasts twice daily for up to 2 weeks at a time 5/17/24  Yes Liv Ferrear MD   lisinopril-hydrochlorothiazide (ZESTORETIC) 10-12.5 MG tablet Take 1 tablet by mouth daily 6/7/24  Yes Liv Ferrera MD   nicotine (NICODERM CQ) 7 MG/24HR 24 hr patch Place 1 patch onto the skin every 24 hours 6/7/24  Yes Liv Ferrera MD   triamcinolone (KENALOG) 0.1 % external cream Apply topically 2 times daily Apply thin layer to rash only on bilateral forearms, hands and face twice daily for up to 14 days. 5/1/24  Yes Liv Ferrera MD  "      No Known Allergies    Social History     Occupational History    Not on file   Tobacco Use    Smoking status: Every Day     Current packs/day: 1.00     Average packs/day: 1 pack/day for 30.3 years (30.3 ttl pk-yrs)     Types: Cigarettes     Start date: 3/7/1994     Passive exposure: Current    Smokeless tobacco: Never   Substance and Sexual Activity    Alcohol use: Yes     Comment: monthly    Drug use: Never    Sexual activity: Not on file       History reviewed. No pertinent family history.    REVIEW OF SYSTEMS            Physical Exam:    Vitals: /84 (Cuff Size: Adult Large)   Pulse 88   Temp 97.4  F (36.3  C) (Tympanic)   Resp 16   Ht 1.6 m (5' 3\")   Wt 104.3 kg (230 lb)   SpO2 94%   BMI 40.74 kg/m    BMI= Body mass index is 40.74 kg/m .  On examination she is awake alert and oriented cooperative no apparent distress.  Has examination of both knees has no erythema no hyperemia skin is intact.  Tender on the medial joint line on both the right and left knee.  Stable to varus valgus stress and anterior drawer and Lachman both knees.  No lateral joint space in either 1.  Negative Yennifer's.  Is no calf tenderness sensory intact light touch distally with 1+ assess pulse pulses.  Radiographs: She had radiographs AP lateral sunrise of both the right and left knees.  On the left it shows advanced osteoarthritic changes near bone-on-bone with the medial Anthony articular osteophytes of both the femur and tibia no significant translation or shift at this point but bone-on-bone on the medial side pain.  Patellofemoral is at some mild narrowing some advancing arthritis but still decent clear space and good tracking right knee in similar fashion has a more bone-on-bone medial joint line with no particular osteophytes and spurs no noted significant translation or shift but also some patellofemoral arthritis but still a tracking nice and still some reasonable clear space.     Independent visualization of the " films was made.         Impression:      ICD-10-CM    1. Arthritis of right knee  M17.11 betamethasone acet & sod phos (CELESTONE) injection 12 mg     lidocaine (PF) (XYLOCAINE) 1 % injection 1 mL      2. Chronic right-sided low back pain with right-sided sciatica  M54.41 Orthopedic  Referral    G89.29       3. Primary localized osteoarthrosis of left lower leg  M17.12 Large Joint/Bursa injection and/or drainage (Shoulder, Knee)     betamethasone acet & sod phos (CELESTONE) injection 12 mg     lidocaine 1 % injection 1 mL          Plan: Impression plan bilateral knee advanced osteoarthritis both medial lateral and medial joint space of both the right and left knee with particular osteophytes.  Discussed treatment options with the patient both operative and nonoperative treatments.  After lengthy discussion she would like to try nonoperative and conservative.  She is already starting some physical therapy to help.  She would like to also try steroid shots to see if she can get improved comfort in both knees.  Feel this is reasonable.  Will prep both knees and proceed with this sterile technique steroid injection both the left and right knee.  She understands limitations and it is not eliminating her arthritis but trying to treat her symptoms.    Procedure: Bilateral knee injections.  The left knee was prepped with alcohol and then ChloraPrep for sterile technique injection.  A solution of 1 cc of 1% lidocaine and 2 cc of 6 mix per mL Celestone have been drawn up under sterile technique.  Left knee was a number of ethyl chloride and with sterile technique injection.  A Band-Aid was placed over the injection site.    The right knee was then prepped in standard fashion also for sterile technique injection with alcohol and then ChloraPrep.  A solution of 1 cc of lidocaine 1% and 6 mixed mL Celestone are drawn up and syringe and sterile technique the area was numbed with chloride and with sterile technique  injection intra-articular injection as well just like the the left entire procedure well.  Band-Aid was placed over the injection site.  Will have her follow-up as a symptoms recur or if she does not get adequate improvement with the injection.    All of the above pertinent physical exam and imaging modalities findings was reviewed with Nhung.    Return to clinic in.  Weeks.    Further imaging required       Time spent with evaluation: 30 minutes    Eleno Lowe MD  6/18/2024  3:19 PM

## 2024-06-28 ENCOUNTER — THERAPY VISIT (OUTPATIENT)
Dept: PHYSICAL THERAPY | Facility: HOSPITAL | Age: 59
End: 2024-06-28
Attending: STUDENT IN AN ORGANIZED HEALTH CARE EDUCATION/TRAINING PROGRAM
Payer: COMMERCIAL

## 2024-06-28 DIAGNOSIS — M25.562 BILATERAL CHRONIC KNEE PAIN: Primary | ICD-10-CM

## 2024-06-28 DIAGNOSIS — G89.29 BILATERAL CHRONIC KNEE PAIN: Primary | ICD-10-CM

## 2024-06-28 DIAGNOSIS — M25.561 BILATERAL CHRONIC KNEE PAIN: Primary | ICD-10-CM

## 2024-06-28 PROCEDURE — 97110 THERAPEUTIC EXERCISES: CPT | Mod: GP,CQ

## 2024-07-07 ENCOUNTER — HEALTH MAINTENANCE LETTER (OUTPATIENT)
Age: 59
End: 2024-07-07

## 2024-07-08 ENCOUNTER — HOSPITAL ENCOUNTER (OUTPATIENT)
Dept: GENERAL RADIOLOGY | Facility: HOSPITAL | Age: 59
Discharge: HOME | End: 2024-07-08
Attending: STUDENT IN AN ORGANIZED HEALTH CARE EDUCATION/TRAINING PROGRAM
Payer: COMMERCIAL

## 2024-07-08 ENCOUNTER — HOSPITAL ENCOUNTER (OUTPATIENT)
Facility: HOSPITAL | Age: 59
Discharge: HOME | End: 2024-07-08
Attending: RADIOLOGY | Admitting: RADIOLOGY
Payer: COMMERCIAL

## 2024-07-08 DIAGNOSIS — M54.41 CHRONIC RIGHT-SIDED LOW BACK PAIN WITH RIGHT-SIDED SCIATICA: ICD-10-CM

## 2024-07-08 DIAGNOSIS — G89.29 CHRONIC RIGHT-SIDED LOW BACK PAIN WITH RIGHT-SIDED SCIATICA: ICD-10-CM

## 2024-07-08 PROCEDURE — 62323 NJX INTERLAMINAR LMBR/SAC: CPT

## 2024-07-08 PROCEDURE — 250N000011 HC RX IP 250 OP 636: Performed by: RADIOLOGY

## 2024-07-08 RX ORDER — IOPAMIDOL 612 MG/ML
15 INJECTION, SOLUTION INTRATHECAL ONCE
Status: COMPLETED | OUTPATIENT
Start: 2024-07-08 | End: 2024-07-08

## 2024-07-08 RX ORDER — DEXAMETHASONE SODIUM PHOSPHATE 10 MG/ML
10 INJECTION, SOLUTION INTRAMUSCULAR; INTRAVENOUS ONCE
Status: COMPLETED | OUTPATIENT
Start: 2024-07-08 | End: 2024-07-08

## 2024-07-08 RX ADMIN — IOPAMIDOL 3 ML: 612 INJECTION, SOLUTION INTRATHECAL at 11:57

## 2024-07-08 RX ADMIN — DEXAMETHASONE SODIUM PHOSPHATE 10 MG: 10 INJECTION, SOLUTION INTRAMUSCULAR; INTRAVENOUS at 11:57

## 2024-07-08 NOTE — DISCHARGE INSTRUCTIONS
Cell number on file:    Telephone Information:   Mobile [phone]     Is it ok to leave a message at this number(s)? Yes    DR HOLCOMB completed your procedure on 7/8/2024.    Current Pain Level (0-10 Scale): 8/10  Post Pain Level (0-10):  0/10    Radiology Discharge instructions for Steroid Injection    Activity Level:     Do not do any heavy activity or exercise for 24 hours.   Do not drive for 4 hours after your injection.  Diet:   Return to your normal diet.  Medications:   If you have stopped taking your Aspirin, Coumadin/Warfarin, Ibuprofen, or any   other blood thinner for this procedure you may resume in the morning unless   your primary care provider has given you other instructions.    Diabetics may see an increase in blood sugar after steroid injections. If you are concerned about your blood sugar, please contact your family doctor.    Site Care:  Remove the bandage and bathe or shower the morning after the procedure.      This is a Pain Management procedure.  You will be contacted in two weeks for follow up.    Call your Primary Care Provider if you have the following (if your primary care provider is not available please seek emergency care):   Nausea with vomiting   Severe headache   Drowsiness or confusion   Redness or drainage at the injection or puncture site   Temperature over 101 degrees F   Other concerns   Worsening back pain   Stiff neck

## 2024-07-12 ENCOUNTER — THERAPY VISIT (OUTPATIENT)
Dept: PHYSICAL THERAPY | Facility: HOSPITAL | Age: 59
End: 2024-07-12
Attending: STUDENT IN AN ORGANIZED HEALTH CARE EDUCATION/TRAINING PROGRAM
Payer: COMMERCIAL

## 2024-07-12 DIAGNOSIS — M25.561 BILATERAL CHRONIC KNEE PAIN: Primary | ICD-10-CM

## 2024-07-12 DIAGNOSIS — M25.562 BILATERAL CHRONIC KNEE PAIN: Primary | ICD-10-CM

## 2024-07-12 DIAGNOSIS — G89.29 BILATERAL CHRONIC KNEE PAIN: Primary | ICD-10-CM

## 2024-07-12 PROCEDURE — 97035 APP MDLTY 1+ULTRASOUND EA 15: CPT | Mod: GP

## 2024-07-12 PROCEDURE — 97110 THERAPEUTIC EXERCISES: CPT | Mod: GP

## 2024-07-22 ENCOUNTER — TELEPHONE (OUTPATIENT)
Dept: INTERVENTIONAL RADIOLOGY/VASCULAR | Facility: HOSPITAL | Age: 59
End: 2024-07-22

## 2024-07-22 NOTE — TELEPHONE ENCOUNTER
INJECTION POST CALL    Was this an IR Referral? YES    Procedure: Epidural TL L5-S1  /Radiologist(s): DR. JOHNNIE HOLCOMB  /Date of Procedure: 7/8/24    The patient was not available by telephone.    Left a message for patient to call IR department back    Pauline Guerrero

## 2024-07-30 ENCOUNTER — TELEPHONE (OUTPATIENT)
Dept: INTERVENTIONAL RADIOLOGY/VASCULAR | Facility: HOSPITAL | Age: 59
End: 2024-07-30

## 2024-07-30 NOTE — TELEPHONE ENCOUNTER
INJECTION POST CALL    Was this an IR Referral? YES    Procedure: Epidural TL L5-S1   Radiologist(s): DR. JOHNNIE HOLCOMB   Date of Procedure: 7/8/24    The patient was not available by telephone.    Left a message for patient to call IR department back  Pauline Guerrero

## 2024-08-09 ENCOUNTER — TELEPHONE (OUTPATIENT)
Dept: INTERVENTIONAL RADIOLOGY/VASCULAR | Facility: HOSPITAL | Age: 59
End: 2024-08-09

## 2024-08-09 NOTE — TELEPHONE ENCOUNTER
INJECTION POST CALL    Was this an IR Referral?     Procedure: Epidural TL lumbar   Radiologist(s): Dick nicholson  Date of Procedure: 7/8/24    The patient had no questions or concerns.    Relief of pain from this injection    A = 90%  A- = 85%  B = 80%  B- =75%  C = 70%  C- = 65%  D = 60%  D- = 50%  F = less than 50%       Did you get any relief from this injection in the past 2 weeks? Yes, patient states 60% relief     If yes,  how long did the relief last  patient states that she is still currently feeling relief     Are you stlll feeling relief? (2 weeks out)  yes  Would you say this injection has been beneficial?yes      Was there one injection that worked better than the other?N/a    Where is the pain? N/a   Can you describe the pain?  Does the pain radiate anywhere?N/a  If yes, where does it radiate and where does the pain stop?N/a    Is this new? No    Patient would not like to pursue another injection at this time.  The patient will contact IR at [phone] if that changes.    Instruct patient to follow up with their provider for any further care they may need.    Judy Ching

## 2024-09-06 ENCOUNTER — TELEPHONE (OUTPATIENT)
Dept: MAMMOGRAPHY | Facility: OTHER | Age: 59
End: 2024-09-06

## 2024-09-06 ENCOUNTER — ANCILLARY PROCEDURE (OUTPATIENT)
Dept: MAMMOGRAPHY | Facility: OTHER | Age: 59
End: 2024-09-06
Attending: STUDENT IN AN ORGANIZED HEALTH CARE EDUCATION/TRAINING PROGRAM
Payer: COMMERCIAL

## 2024-09-06 DIAGNOSIS — Z12.31 ENCOUNTER FOR SCREENING MAMMOGRAM FOR BREAST CANCER: ICD-10-CM

## 2024-09-06 PROCEDURE — 77067 SCR MAMMO BI INCL CAD: CPT | Mod: TC | Performed by: RADIOLOGY

## 2024-09-06 PROCEDURE — 77063 BREAST TOMOSYNTHESIS BI: CPT | Mod: TC | Performed by: RADIOLOGY

## 2024-09-13 ENCOUNTER — OFFICE VISIT (OUTPATIENT)
Dept: FAMILY MEDICINE | Facility: OTHER | Age: 59
End: 2024-09-13
Attending: STUDENT IN AN ORGANIZED HEALTH CARE EDUCATION/TRAINING PROGRAM
Payer: COMMERCIAL

## 2024-09-13 VITALS
TEMPERATURE: 98.1 F | SYSTOLIC BLOOD PRESSURE: 180 MMHG | BODY MASS INDEX: 43.26 KG/M2 | DIASTOLIC BLOOD PRESSURE: 100 MMHG | OXYGEN SATURATION: 97 % | HEART RATE: 86 BPM | WEIGHT: 244.13 LBS | RESPIRATION RATE: 16 BRPM | HEIGHT: 63 IN

## 2024-09-13 DIAGNOSIS — G89.29 CHRONIC PAIN OF LEFT KNEE: Primary | ICD-10-CM

## 2024-09-13 DIAGNOSIS — I10 ESSENTIAL HYPERTENSION: ICD-10-CM

## 2024-09-13 DIAGNOSIS — M25.562 CHRONIC PAIN OF LEFT KNEE: Primary | ICD-10-CM

## 2024-09-13 DIAGNOSIS — Z71.85 VACCINE COUNSELING: ICD-10-CM

## 2024-09-13 DIAGNOSIS — F17.210 CIGARETTE NICOTINE DEPENDENCE WITHOUT COMPLICATION: ICD-10-CM

## 2024-09-13 DIAGNOSIS — R01.1 SYSTOLIC MURMUR: ICD-10-CM

## 2024-09-13 PROCEDURE — 90471 IMMUNIZATION ADMIN: CPT | Performed by: STUDENT IN AN ORGANIZED HEALTH CARE EDUCATION/TRAINING PROGRAM

## 2024-09-13 PROCEDURE — 90677 PCV20 VACCINE IM: CPT | Performed by: STUDENT IN AN ORGANIZED HEALTH CARE EDUCATION/TRAINING PROGRAM

## 2024-09-13 PROCEDURE — 99214 OFFICE O/P EST MOD 30 MIN: CPT | Mod: 25 | Performed by: STUDENT IN AN ORGANIZED HEALTH CARE EDUCATION/TRAINING PROGRAM

## 2024-09-13 PROCEDURE — 90673 RIV3 VACCINE NO PRESERV IM: CPT | Performed by: STUDENT IN AN ORGANIZED HEALTH CARE EDUCATION/TRAINING PROGRAM

## 2024-09-13 PROCEDURE — 90472 IMMUNIZATION ADMIN EACH ADD: CPT | Performed by: STUDENT IN AN ORGANIZED HEALTH CARE EDUCATION/TRAINING PROGRAM

## 2024-09-13 RX ORDER — LISINOPRIL AND HYDROCHLOROTHIAZIDE 12.5; 2 MG/1; MG/1
1 TABLET ORAL DAILY
Qty: 30 TABLET | Refills: 0 | Status: SHIPPED | OUTPATIENT
Start: 2024-09-13

## 2024-09-13 RX ORDER — GABAPENTIN 300 MG/1
CAPSULE ORAL
Qty: 60 CAPSULE | Refills: 0 | Status: SHIPPED | OUTPATIENT
Start: 2024-09-13 | End: 2024-09-20

## 2024-09-13 ASSESSMENT — PAIN SCALES - GENERAL: PAINLEVEL: EXTREME PAIN (9)

## 2024-09-13 NOTE — PROGRESS NOTES
Assessment & Plan     Chronic pain of left knee  Bilateral knee pain with L>R.    Was seen by Dr. Lowe 06/08/2024 for knee injections.  She tells me that the knee injections did provide temporary relief but pain is now back to baseline.    She is reporting severe pain in her left knee and some knee pain in her right knee  We will obtain MR of the left knee to evaluate further.  She has tried PT with partial and short lasting relief.  I encourage she continue the exercises and work towards weight loss   She would like something for pain.  I discussed options and she is agreeable to gabapentin.    - MR Knee Left w/o Contrast; Future  - gabapentin (NEURONTIN) 300 MG capsule; Start with 300 mg at bedtime.  Can double to 600 mg at bedtime after 1-2 days.    Essential hypertension  BP very high today.  Anxiety and pain contributing most likely. She tells me her home BP measurements are running much lower.  We will gradually titrate her BP medications to effect.  I will increase her Zestoretic (lisinopril component) and have her RTC for nurse only next week for a BP check.   I advise her to bring her home BP monitor next week for comparison  Advise on DASH diet  Advise on weight loss  I will see her in 4 weeks otherwise  - lisinopril-hydrochlorothiazide (ZESTORETIC) 20-12.5 MG tablet; Take 1 tablet by mouth daily.    Systolic murmur  Soft 1-2 systolic murmur.  In setting of severe HTN I feel that cardiac echo is warranted for further evaluation.  Trace edema to MARY LOU LE.  - Echocardiogram Complete; Future    Vaccine counseling  Due for vaccines  - INFLUENZA VACCINE IM 18-64 YRS (FLUBLOK)  - PNEUMOCOCCAL 20 VALENT CONJUGATE (PREVNAR 20)    Cigarette nicotine dependence without complication  Working on cessation.  Currently <1/2 PPD  She is using patches and is almost out so will refill  - nicotine (NICODERM CQ) 7 MG/24HR 24 hr patch; Place 1 patch over 24 hours onto the skin every 24 hours.          Return in about 4  weeks (around 10/11/2024) for Follow up.    Parris Hooker is a 59 year old, presenting for the following health issues:  Hypertension and Back Pain        9/13/2024     8:42 AM   Additional Questions   Roomed by Haley Clarke   Accompanied by None         9/13/2024     8:42 AM   Patient Reported Additional Medications   Patient reports taking the following new medications None     History of Present Illness       Reason for visit:  Follow up    She eats 2-3 servings of fruits and vegetables daily.She consumes 0 sweetened beverage(s) daily.She exercises with enough effort to increase her heart rate 9 or less minutes per day.  She exercises with enough effort to increase her heart rate 3 or less days per week.   She is taking medications regularly.     Saw Dr. Lowe on 6/18 and had injections into both knees at that time.  Knee injections did help for a little while but now not finding any relief.    Has gone to PT which helped some.  Has been doing exercises at home.     is having some health issues and she tells me she cannot get  He had a seizure and being worked up for the cause of this.  Will need a bone marrow biopsy- had stenting on his legs.    Knee pain is unbearable   On feet all day at work- working 10 hours 4 days a week.  By 11 o clock AM it hurts a lot and when she gets home she feels useless.    Past two weeks pain in her legs is waking her up at night.      Hypertension Follow-up    Do you check your blood pressure regularly outside of the clinic? Yes   Are you following a low salt diet? Yes  Are your blood pressures ever more than 140 on the top number (systolic) OR more   than 90 on the bottom number (diastolic), for example 140/90? No      Pain History:  When did you first notice your pain? Chronic    Have you seen this provider for your pain in the past? Yes   Where in your body do you have pain? Low back bilateral, bilateral knee pain   Are you seeing anyone else for your pain?  "Yes Ortho for the knees          5/1/2024     8:21 AM 5/17/2024     8:08 AM   PHQ-9 SCORE   PHQ-9 Total Score MyChart 8 (Mild depression) 6 (Mild depression)   PHQ-9 Total Score 8 6           5/1/2024     8:23 AM 5/17/2024     8:09 AM   RUDY-7 SCORE   Total Score 12 (moderate anxiety) 13 (moderate anxiety)   Total Score 12 13         Chronic Pain Follow Up:    Location of pain: Bilateral knees with L>R  Analgesia/pain control:    - Recent changes:  Improved with steroid injections but now back to baseline pain    - Overall control: Inadequate pain control    - Current treatments: Steroid knee injection and OTC  tylenol and ibuprofen   Adherence:     - Do you ever take more pain medicine than prescribed? No    - When did you take your last dose of pain medicine?  today   Adverse effects: No   PDMP Review       None          Last CSA Agreement:   CSA -- Patient Level:    CSA: None found at the patient level.           Review of Systems  Constitutional, HEENT, cardiovascular, pulmonary, GI, , musculoskeletal, neuro, skin, endocrine and psych systems are negative, except as otherwise noted.      Objective    BP (!) 178/122 (BP Location: Left arm, Patient Position: Sitting, Cuff Size: Adult Large)   Pulse 86   Temp 98.1  F (36.7  C) (Tympanic)   Resp 16   Ht 1.6 m (5' 3\")   Wt 110.7 kg (244 lb 2 oz)   SpO2 97%   BMI 43.24 kg/m    Body mass index is 43.24 kg/m .  Physical Exam   GENERAL: alert and no distress  EYES: Eyes grossly normal to inspection, PERRL and conjunctivae and sclerae normal  HENT: ear canals and TM's normal, nose and mouth without ulcers or lesions  NECK: no adenopathy, no asymmetry, masses, or scars  RESP: lungs clear to auscultation - no rales, rhonchi or wheezes  CV: regular rate and rhythm, normal S1 S2, no S3 or S4, no murmur, click or rub, no peripheral edema  ABDOMEN: soft, nontender, no hepatosplenomegaly, no masses and bowel sounds normal  MS: no gross musculoskeletal defects noted, no " edema  SKIN: no suspicious lesions or rashes  NEURO: Normal strength and tone, mentation intact and speech normal  PSYCH: mentation appears normal, affect normal/bright            Signed Electronically by: Liv Ferrera MD

## 2024-09-20 ENCOUNTER — ALLIED HEALTH/NURSE VISIT (OUTPATIENT)
Dept: FAMILY MEDICINE | Facility: OTHER | Age: 59
End: 2024-09-20
Attending: STUDENT IN AN ORGANIZED HEALTH CARE EDUCATION/TRAINING PROGRAM
Payer: COMMERCIAL

## 2024-09-20 ENCOUNTER — TELEPHONE (OUTPATIENT)
Dept: FAMILY MEDICINE | Facility: OTHER | Age: 59
End: 2024-09-20

## 2024-09-20 VITALS — SYSTOLIC BLOOD PRESSURE: 142 MMHG | DIASTOLIC BLOOD PRESSURE: 86 MMHG

## 2024-09-20 DIAGNOSIS — M25.562 CHRONIC PAIN OF LEFT KNEE: ICD-10-CM

## 2024-09-20 DIAGNOSIS — G89.29 CHRONIC PAIN OF LEFT KNEE: ICD-10-CM

## 2024-09-20 DIAGNOSIS — I10 ESSENTIAL HYPERTENSION: Primary | ICD-10-CM

## 2024-09-20 RX ORDER — GABAPENTIN 300 MG/1
CAPSULE ORAL
Qty: 120 CAPSULE | Refills: 0 | Status: SHIPPED | OUTPATIENT
Start: 2024-09-20

## 2024-09-20 NOTE — TELEPHONE ENCOUNTER
Writer was informed by Geeta that patient presented for her nurse only to have her blood pressure checked. Patient informed the nurse that she is wanting something else for her pain. She stated that the gabapentin is not working for her. Please advise.

## 2024-09-20 NOTE — TELEPHONE ENCOUNTER
"Discussed further with Pt via phone  States she was a little confused earlier  She IS taking 600mg, daily  She started this dose around 9/16  States it helps but need something more to \"take the edge off\"    Writer teed up script for PcP to review  "

## 2024-09-24 ENCOUNTER — TELEPHONE (OUTPATIENT)
Dept: FAMILY MEDICINE | Facility: OTHER | Age: 59
End: 2024-09-24

## 2024-09-24 NOTE — TELEPHONE ENCOUNTER
1:10 PM    Reason for Call: Phone Call    Description: Patient talked with Joyce on Friday re: Gabapentin. She stated that medication is not at the [facility] at the [facility] ([facility]) from [facility]. Please give her a call to discuss status of medication.    Was an appointment offered for this call? No  If yes : Appointment type              Date    Preferred method for responding to this message: Telephone Call  What is your phone number ?  Work #: [phone]    If we cannot reach you directly, may we leave a detailed response at the number you provided? No    Can this message wait until your PCP/provider returns, if available today? Not applicable    Fozia Suarez

## 2024-09-24 NOTE — TELEPHONE ENCOUNTER
Patient returned call. She stated that pharmacy never received medication at [facility]. She is going to go back and check with them.

## 2024-09-24 NOTE — TELEPHONE ENCOUNTER
Pt would like to talk to a nurse from Dr. Ferrera care team about this please call pt [phone]

## 2024-09-24 NOTE — TELEPHONE ENCOUNTER
Medication sent and received by [facility] 9/20/24.  Called patient and left vm to return call for information.

## 2024-09-26 NOTE — TELEPHONE ENCOUNTER
Writer spoke with pharm tech at [facility]. Tech stated that they did receive this RX, but it required a prior auth. Writer lvm for patient stating that this can sometimes take a few days to process. They are working on it. Writer advised she call back with any further questions.

## 2024-09-30 ENCOUNTER — HOSPITAL ENCOUNTER (OUTPATIENT)
Dept: CARDIOLOGY | Facility: HOSPITAL | Age: 59
Discharge: HOME | End: 2024-09-30
Attending: STUDENT IN AN ORGANIZED HEALTH CARE EDUCATION/TRAINING PROGRAM
Payer: COMMERCIAL

## 2024-09-30 ENCOUNTER — HOSPITAL ENCOUNTER (OUTPATIENT)
Dept: MRI IMAGING | Facility: HOSPITAL | Age: 59
Discharge: HOME | End: 2024-09-30
Attending: STUDENT IN AN ORGANIZED HEALTH CARE EDUCATION/TRAINING PROGRAM
Payer: COMMERCIAL

## 2024-09-30 DIAGNOSIS — G89.29 CHRONIC PAIN OF LEFT KNEE: ICD-10-CM

## 2024-09-30 DIAGNOSIS — M25.562 CHRONIC PAIN OF LEFT KNEE: ICD-10-CM

## 2024-09-30 DIAGNOSIS — R01.1 SYSTOLIC MURMUR: ICD-10-CM

## 2024-09-30 LAB — LVEF ECHO: NORMAL

## 2024-09-30 PROCEDURE — 73721 MRI JNT OF LWR EXTRE W/O DYE: CPT | Mod: LT

## 2024-09-30 PROCEDURE — 93306 TTE W/DOPPLER COMPLETE: CPT

## 2024-09-30 PROCEDURE — 93306 TTE W/DOPPLER COMPLETE: CPT | Mod: 26 | Performed by: INTERNAL MEDICINE

## 2024-10-01 DIAGNOSIS — M25.562 CHRONIC PAIN OF LEFT KNEE: Primary | ICD-10-CM

## 2024-10-01 DIAGNOSIS — S83.242A TEAR OF MEDIAL MENISCUS OF LEFT KNEE, CURRENT, UNSPECIFIED TEAR TYPE, INITIAL ENCOUNTER: ICD-10-CM

## 2024-10-01 DIAGNOSIS — G89.29 CHRONIC PAIN OF LEFT KNEE: Primary | ICD-10-CM

## 2024-10-01 NOTE — RESULT ENCOUNTER NOTE
There is trace (very minimal) mitral insufficiency. And trace tricuspid valve (very minimal again) nothing she needs to worry about.

## 2024-10-11 ENCOUNTER — OFFICE VISIT (OUTPATIENT)
Dept: FAMILY MEDICINE | Facility: OTHER | Age: 59
End: 2024-10-11
Attending: STUDENT IN AN ORGANIZED HEALTH CARE EDUCATION/TRAINING PROGRAM
Payer: COMMERCIAL

## 2024-10-11 VITALS
SYSTOLIC BLOOD PRESSURE: 176 MMHG | OXYGEN SATURATION: 95 % | BODY MASS INDEX: 42.7 KG/M2 | HEART RATE: 93 BPM | HEIGHT: 63 IN | WEIGHT: 241 LBS | RESPIRATION RATE: 18 BRPM | TEMPERATURE: 97.6 F | DIASTOLIC BLOOD PRESSURE: 98 MMHG

## 2024-10-11 DIAGNOSIS — I10 ESSENTIAL HYPERTENSION: ICD-10-CM

## 2024-10-11 DIAGNOSIS — R73.03 PRE-DIABETES: Primary | ICD-10-CM

## 2024-10-11 DIAGNOSIS — E66.01 MORBID OBESITY (H): ICD-10-CM

## 2024-10-11 DIAGNOSIS — F41.1 GENERALIZED ANXIETY DISORDER: ICD-10-CM

## 2024-10-11 PROCEDURE — 99214 OFFICE O/P EST MOD 30 MIN: CPT | Performed by: STUDENT IN AN ORGANIZED HEALTH CARE EDUCATION/TRAINING PROGRAM

## 2024-10-11 RX ORDER — LISINOPRIL AND HYDROCHLOROTHIAZIDE 12.5; 2 MG/1; MG/1
1 TABLET ORAL DAILY
Qty: 30 TABLET | Refills: 0 | Status: SHIPPED | OUTPATIENT
Start: 2024-10-11 | End: 2024-11-08

## 2024-10-11 RX ORDER — VENLAFAXINE HYDROCHLORIDE 37.5 MG/1
37.5 CAPSULE, EXTENDED RELEASE ORAL DAILY
Qty: 30 CAPSULE | Refills: 0 | Status: SHIPPED | OUTPATIENT
Start: 2024-10-11 | End: 2024-11-08

## 2024-10-11 ASSESSMENT — PAIN SCALES - GENERAL: PAINLEVEL: WORST PAIN (10)

## 2024-10-11 NOTE — PROGRESS NOTES
Assessment & Plan     Essential hypertension  BP significantly improved  Continue zestoretic 20-12.5  Continue DASH diet  Follow-up in 6 months  - lisinopril-hydrochlorothiazide (ZESTORETIC) 20-12.5 MG tablet; Take 1 tablet by mouth daily.    Pre-diabetes  HA1c 5.8  BMI of 41.69  - semaglutide (OZEMPIC) 2 MG/3ML pen; Inject 0.25 mg subcutaneously every 7 days.    Morbid obesity (H)  BMI of 42.69  Desires weight loss  Discussed medication options and reviewed side effects  Reviewed BB warning  Patient would like to try ozempic for weight loss  - semaglutide (OZEMPIC) 2 MG/3ML pen; Inject 0.25 mg subcutaneously every 7 days.    Generalized anxiety disorder  Not well controlled  Discussed medication options  Recommend concurrent therapy  Reviewed side effects  Follow up in 1 month for medication review  - venlafaxine (EFFEXOR XR) 37.5 MG 24 hr capsule; Take 1 capsule (37.5 mg) by mouth daily.        No follow-ups on file.    Parris Hooker is a 59 year old, presenting for the following health issues:  Musculoskeletal Problem and Hypertension        10/11/2024     8:16 AM   Additional Questions   Roomed by April   Accompanied by self         10/11/2024     8:16 AM   Patient Reported Additional Medications   Patient reports taking the following new medications none     History of Present Illness       Hypertension: She presents for follow up of hypertension.  She does check blood pressure  regularly outside of the clinic. Outside blood pressures have been over 140/90. She does not follow a low salt diet.     She eats 2-3 servings of fruits and vegetables daily.She consumes 1 sweetened beverage(s) daily.She exercises with enough effort to increase her heart rate 9 or less minutes per day.  She exercises with enough effort to increase her heart rate 3 or less days per week.   She is taking medications regularly.     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 Yes   Are you following a low salt diet? No  Are your blood pressures ever more than 140 on the top number (systolic) OR more   than 90 on the bottom number (diastolic), for example 140/90? Yes    Musculoskeletal problem/pain    Duration: 3 years  Description  Location: left knee  Intensity:  severe  Accompanying signs and symptoms: weakness of knee and swelling  History  Previous similar problem: no   Previous evaluation:  x-ray and MRI  Precipitating or alleviating factors:  Trauma or overuse: YES  Aggravating factors include: standing, walking, climbing stairs, lifting, exercise, overuse, and cold or damp weather  Therapies tried and outcome: rest/inactivity, heat, immobilization, massage, acetaminophen, Ibuprofen, and physical therapy       Review of Systems  Constitutional, HEENT, cardiovascular, pulmonary, GI, , musculoskeletal, neuro, skin, endocrine and psych systems are negative, except as otherwise noted.      Objective    BP (!) 176/98 (BP Location: Right arm, Patient Position: Sitting, Cuff Size: Adult Regular)   Pulse 93   Temp 97.6  F (36.4  C) (Tympanic)   Resp 18   Ht 1.6 m (5' 3\")   Wt 109.3 kg (241 lb)   SpO2 95%   BMI 42.69 kg/m    Body mass index is 42.69 kg/m .  Physical Exam   GENERAL: alert and no distress  EYES: Eyes grossly normal to inspection, PERRL and conjunctivae and sclerae normal  HENT: ear canals and TM's normal, nose and mouth without ulcers or lesions  NECK: no adenopathy, no asymmetry, masses, or scars  RESP: lungs clear to auscultation - no rales, rhonchi or wheezes  CV: regular rate and rhythm, normal S1 S2, no S3 or S4, no murmur, click or rub, no peripheral edema  ABDOMEN: soft, nontender, no hepatosplenomegaly, no masses and bowel sounds normal  MS: no gross musculoskeletal defects noted, no edema  SKIN: no suspicious lesions " or rashes  NEURO: Normal strength and tone, mentation intact and speech normal  PSYCH: mentation appears normal, affect normal/bright          Signed Electronically by: Liv Ferrera MD

## 2024-10-11 NOTE — PATIENT INSTRUCTIONS
Jo Ruvalcaba- Neuromuscular Massage therapist/Deep tissue massage/myofascial release  [phone]

## 2024-10-18 ENCOUNTER — ALLIED HEALTH/NURSE VISIT (OUTPATIENT)
Dept: FAMILY MEDICINE | Facility: OTHER | Age: 59
End: 2024-10-18
Attending: STUDENT IN AN ORGANIZED HEALTH CARE EDUCATION/TRAINING PROGRAM
Payer: COMMERCIAL

## 2024-10-18 ENCOUNTER — TELEPHONE (OUTPATIENT)
Dept: FAMILY MEDICINE | Facility: OTHER | Age: 59
End: 2024-10-18

## 2024-10-18 ENCOUNTER — MYC REFILL (OUTPATIENT)
Dept: FAMILY MEDICINE | Facility: OTHER | Age: 59
End: 2024-10-18

## 2024-10-18 VITALS — DIASTOLIC BLOOD PRESSURE: 80 MMHG | HEART RATE: 72 BPM | SYSTOLIC BLOOD PRESSURE: 132 MMHG

## 2024-10-18 DIAGNOSIS — M25.562 CHRONIC PAIN OF LEFT KNEE: ICD-10-CM

## 2024-10-18 DIAGNOSIS — I10 ESSENTIAL HYPERTENSION: Primary | ICD-10-CM

## 2024-10-18 DIAGNOSIS — G89.29 CHRONIC PAIN OF LEFT KNEE: ICD-10-CM

## 2024-10-18 RX ORDER — GABAPENTIN 300 MG/1
CAPSULE ORAL
Qty: 120 CAPSULE | Refills: 0 | Status: SHIPPED | OUTPATIENT
Start: 2024-10-18 | End: 2024-10-21

## 2024-10-18 NOTE — TELEPHONE ENCOUNTER
gabapentin (NEURONTIN) 300 MG capsule       Last Written Prescription Date:  09/20/2024  Last Fill Quantity: 120,   # refills: 0  Last Office Visit: 10/11/2024  Future Office visit:    Next 5 appointments (look out 90 days)      Nov 08, 2024 9:00 AM  (Arrive by 8:45 AM)  Provider Visit with Liv Ferrera MD  [facility] ([facility] ) [address]  [place] [address]  [phone]             Routing refill request to provider for review/approval because:    Patient comment: Could I get a refill for this and can I start taking 4 a day?       Kimberly Boecker, RN

## 2024-10-18 NOTE — TELEPHONE ENCOUNTER
I met with Nhung Conroy at the request of Dr. Ferrera  to recheck her blood pressure.  Blood pressure medications on the med list were reviewed with patient.    Patient has taken all medications as per usual regimen: Yes  Patient reports tolerating them without any issues or concerns: Yes    Vitals:    10/18/24 0854   BP: 132/80   BP Location: Right arm   Patient Position: Sitting   Cuff Size: Adult Large   Pulse: 72       Blood pressure was taken, previous encounter was reviewed, recorded blood pressure below 140/90.  Patient was discharged and the note will be sent to the provider for final review.

## 2024-10-21 ENCOUNTER — MYC REFILL (OUTPATIENT)
Dept: FAMILY MEDICINE | Facility: OTHER | Age: 59
End: 2024-10-21

## 2024-10-21 DIAGNOSIS — R73.03 PRE-DIABETES: ICD-10-CM

## 2024-10-21 DIAGNOSIS — G89.29 CHRONIC PAIN OF LEFT KNEE: ICD-10-CM

## 2024-10-21 DIAGNOSIS — M25.562 CHRONIC PAIN OF LEFT KNEE: ICD-10-CM

## 2024-10-21 DIAGNOSIS — E66.01 MORBID OBESITY (H): ICD-10-CM

## 2024-10-22 RX ORDER — GABAPENTIN 300 MG/1
CAPSULE ORAL
Qty: 120 CAPSULE | Refills: 0 | Status: SHIPPED | OUTPATIENT
Start: 2024-10-22

## 2024-10-24 ENCOUNTER — MYC REFILL (OUTPATIENT)
Dept: FAMILY MEDICINE | Facility: OTHER | Age: 59
End: 2024-10-24

## 2024-10-24 DIAGNOSIS — M25.562 CHRONIC PAIN OF LEFT KNEE: ICD-10-CM

## 2024-10-24 DIAGNOSIS — G89.29 CHRONIC PAIN OF LEFT KNEE: ICD-10-CM

## 2024-10-25 RX ORDER — GABAPENTIN 300 MG/1
CAPSULE ORAL
Qty: 120 CAPSULE | Refills: 0 | OUTPATIENT
Start: 2024-10-25

## 2024-10-25 NOTE — TELEPHONE ENCOUNTER
Gabapentin 300 mg cap      Last Written Prescription Date:  10-22-24  Last Fill Quantity: 120,   # refills: 0  Last Office Visit: 10-11-24  Future Office visit:    Next 5 appointments (look out 90 days)      Nov 08, 2024 9:00 AM  (Arrive by 8:45 AM)  Provider Visit with Liv Ferrera MD  [facility] [place] ([facility] [place] ) [address]  [place] [address]  [phone]

## 2024-10-30 ENCOUNTER — OFFICE VISIT (OUTPATIENT)
Dept: ORTHOPEDICS | Facility: OTHER | Age: 59
End: 2024-10-30
Attending: STUDENT IN AN ORGANIZED HEALTH CARE EDUCATION/TRAINING PROGRAM
Payer: COMMERCIAL

## 2024-10-30 VITALS
DIASTOLIC BLOOD PRESSURE: 72 MMHG | SYSTOLIC BLOOD PRESSURE: 146 MMHG | TEMPERATURE: 97.4 F | OXYGEN SATURATION: 94 % | HEART RATE: 77 BPM

## 2024-10-30 DIAGNOSIS — M17.12 PRIMARY LOCALIZED OSTEOARTHROSIS OF LEFT LOWER LEG: Primary | ICD-10-CM

## 2024-10-30 DIAGNOSIS — S83.242A TEAR OF MEDIAL MENISCUS OF LEFT KNEE, CURRENT, UNSPECIFIED TEAR TYPE, INITIAL ENCOUNTER: ICD-10-CM

## 2024-10-30 PROCEDURE — 99214 OFFICE O/P EST MOD 30 MIN: CPT | Mod: 25 | Performed by: ORTHOPAEDIC SURGERY

## 2024-10-30 PROCEDURE — 20610 DRAIN/INJ JOINT/BURSA W/O US: CPT | Mod: LT | Performed by: ORTHOPAEDIC SURGERY

## 2024-10-30 RX ORDER — BETAMETHASONE SODIUM PHOSPHATE AND BETAMETHASONE ACETATE 3; 3 MG/ML; MG/ML
12 INJECTION, SUSPENSION INTRA-ARTICULAR; INTRALESIONAL; INTRAMUSCULAR; SOFT TISSUE ONCE
Status: COMPLETED | OUTPATIENT
Start: 2024-10-30 | End: 2024-10-30

## 2024-10-30 RX ORDER — LIDOCAINE HYDROCHLORIDE 10 MG/ML
1 INJECTION, SOLUTION EPIDURAL; INFILTRATION; INTRACAUDAL; PERINEURAL ONCE
Status: COMPLETED | OUTPATIENT
Start: 2024-10-30 | End: 2024-10-30

## 2024-10-30 RX ADMIN — LIDOCAINE HYDROCHLORIDE 1 ML: 10 INJECTION, SOLUTION EPIDURAL; INFILTRATION; INTRACAUDAL; PERINEURAL at 15:01

## 2024-10-30 RX ADMIN — BETAMETHASONE SODIUM PHOSPHATE AND BETAMETHASONE ACETATE 12 MG: 3; 3 INJECTION, SUSPENSION INTRA-ARTICULAR; INTRALESIONAL; INTRAMUSCULAR; SOFT TISSUE at 15:02

## 2024-10-30 ASSESSMENT — PAIN SCALES - GENERAL: PAINLEVEL_OUTOF10: EXTREME PAIN (9)

## 2024-10-30 NOTE — PATIENT INSTRUCTIONS
Thank you for allowing Dr. Eleno Lowe and his team to participate in your care. Please call our health unit coordinator at [phone] with scheduling questions or the nurse at [phone] with any other questions or concerns.        You may call the Orthopedic nurse at [phone] with any questions.

## 2024-10-30 NOTE — PROGRESS NOTES
ORTHOPEDIC CLINIC CONSULT    Referred by:     Chief Complaint: Nhung Conroy is a 59 year old female who is being seen for No chief complaint on file.      History of Present Illness:   Patient 59-year-old female presents back for left knee pain osteoarthritis as well as meniscus tear degenerative.  When he saw her back around June and she had underwent a knee injection.  She now is presenting back without recurring symptoms medial joint space and struggling with longer periods of standing particular at work or standing and ambulating.  She presents back to discuss options for possible repeat injection as well.    Patient's past medical, surgical, social and family histories reviewed.     No past medical history on file.    No past surgical history on file.    Home Medications:  Prior to Admission medications    Medication Sig Start Date End Date Taking? Authorizing Provider   clindamycin (CLEOCIN-T) 1 % external gel Apply to rash between thighs and under breasts twice daily for up to 2 weeks at a time 5/17/24  Yes Liv Ferrera MD   gabapentin (NEURONTIN) 300 MG capsule Take one capsule (300 mg) three times daily. 10/22/24  Yes Liv Ferrera MD   lisinopril-hydrochlorothiazide (ZESTORETIC) 20-12.5 MG tablet Take 1 tablet by mouth daily. 10/11/24  Yes Liv Ferrera MD   nicotine (NICODERM CQ) 7 MG/24HR 24 hr patch Place 1 patch over 24 hours onto the skin every 24 hours. 9/13/24  Yes Liv Ferrera MD   nicotine (NICODERM CQ) 7 MG/24HR 24 hr patch Place 1 patch onto the skin every 24 hours 6/7/24  Yes Liv Ferrera MD   semaglutide (OZEMPIC) 2 MG/3ML pen Inject 0.25 mg subcutaneously every 7 days. 10/22/24  Yes Liv Ferrera MD   triamcinolone (KENALOG) 0.1 % external cream Apply topically 2 times daily Apply thin layer to rash only on bilateral forearms, hands and face twice daily for up to 14 days. 5/1/24  Yes Liv Ferrera MD   venlafaxine (EFFEXOR XR) 37.5 MG 24 hr capsule Take 1 capsule  (37.5 mg) by mouth daily. 10/11/24  Yes Liv Ferrera MD       No Known Allergies    Social History     Occupational History    Not on file   Tobacco Use    Smoking status: Every Day     Current packs/day: 1.00     Average packs/day: 1 pack/day for 30.6 years (30.6 ttl pk-yrs)     Types: Cigarettes     Start date: 3/7/1994     Passive exposure: Current    Smokeless tobacco: Never   Vaping Use    Vaping status: Never Used   Substance and Sexual Activity    Alcohol use: Yes     Comment: monthly    Drug use: Never    Sexual activity: Not on file       No family history on file.    REVIEW OF SYSTEMS            Physical Exam:    Vitals: BP (!) 146/72 (BP Location: Left arm, Patient Position: Sitting, Cuff Size: Adult Large)   Pulse 77   Temp 97.4  F (36.3  C) (Tympanic)   SpO2 94%   BMI= There is no height or weight on file to calculate BMI.  On examination she is awake alert and oriented cooperative no apparent distress.  Afebrile vital signs stable stable varus valgus stress anterior drawer and Lachman medial joint line tenderness negative Yennifer's no calf tenderness.  Radiographs: No new radiographs previous radiographs note near bone-on-bone medial joint space with para-articular osteophytes particular medial joint space as well as facet degenerative changes medial joint space and degenerative meniscal complex tearing.     Independent visualization of the films was made.         Impression:      ICD-10-CM    1. Primary localized osteoarthrosis of left lower leg  M17.12 Large Joint/Bursa injection and/or drainage (Shoulder, Knee)     betamethasone acet & sod phos (CELESTONE) injection 12 mg     lidocaine (PF) (XYLOCAINE) 1 % injection 1 mL      2. Tear of medial meniscus of left knee, current, unspecified tear type, initial encounter  S83.242A Orthopedic  Referral          Plan: Impression plan left knee pain meniscal tear advanced osteoarthritis.  After lengthy discussion again discussed the  treatment options between surgical and nonsurgical as well as downtime potential risk, complications outcome.  Also discussed such as a arthroscopy with clean up clamps on the meniscus however she is advanced enough near bone-on-bone that more likely than not will not get any sustainable long-term improvement and discussed with the patient.  And after lengthy discussion she would like to still try some of the more conservative things first advised much time as possible she would like to try another injection also discussed some of the viscosupplementation but she would like to first try just a steroid shot.  Also discussed  braces to also help with certain activities and improve as well she will consider that as well but wants to proceed with injection today.    Procedure left knee steroid injection.  Left knee was prepped with alcohol and ChloraPrep for sterile technique injection.  A solution of 1 cc of lidocaine and 2 cc of 6 mg/mL Celestone was drawn up under sterile technique.  The area of the knee was then numbed with ethyl chloride and with sterile technique injection to the knee.  Tolerated well.  Band-Aid placed over the injection site.    All of the above pertinent physical exam and imaging modalities findings was reviewed with Nhung.    Return to clinic in as needed weeks.    Further imaging required none at this time    Time spent with evaluation: 30 minutes    Eleno Lowe MD  10/30/2024  3:04 PM

## 2024-11-04 NOTE — PROGRESS NOTES
Assessment & Plan     Essential hypertension  BP much improved overall  Needs updated labs today to ensure normal electrolytes and kidney function on her Zestoretic  Emphasized lifestyle and dietary changes to promote weight loss, DASH diet  Continue home BP monitoring.  Still have significant elevations to 170s systolic and 90s diastolic at home.  Attributing these BP spikes to pain.  Has significant knee pain from arthritis which she is getting addressed with her Orthopaedic doctor, Dr. Lowe.    - lisinopril-hydrochlorothiazide (ZESTORETIC) 20-12.5 MG tablet; Take 1 tablet by mouth daily.  - Basic metabolic panel; Future    Generalized anxiety disorder  Anxiety improved with Effexor.  She is tolerating medication well with no reported adverse effects  Has been on 37.5 for the last month  Recommend increasing dose to 75 mg daily which is low end of normal therapeutic dose.  Follow-up in 8 weeks  Recommend concurrent therapy.    - venlafaxine (EFFEXOR) 75 MG tablet; Take 1 tablet (75 mg) by mouth 3 times daily.    Pre-diabetes  Ha1c of 5.8 today  - semaglutide (OZEMPIC) 2 MG/3ML pen; Inject 0.25 mg subcutaneously every 7 days.    Morbid obesity (H)  BMI of 42.8  Wants to try ozempic for weight loss  - semaglutide (OZEMPIC) 2 MG/3ML pen; Inject 0.25 mg subcutaneously every 7 days.    History of vitamin D deficiency  Vitamin D level 19 on 5/17  S/p 12 week course of high dose vitamin D  Due for recheck  - Vitamin D Deficiency; Future    Hyperlipidemia LDL goal <100  The 10-year ASCVD risk score (Luc HURTADO, et al., 2019) is: 11.6%    Values used to calculate the score:      Age: 59 years      Sex: Female      Is Non- : No      Diabetic: No      Tobacco smoker: Yes      Systolic Blood Pressure: 136 mmHg      Is BP treated: Yes      HDL Cholesterol: 55 mg/dL      Total Cholesterol: 212 mg/dL      BMI  Estimated body mass index is 42.38 kg/m  as calculated from the following:    Height as of  "this encounter: 1.6 m (5' 3\").    Weight as of this encounter: 108.5 kg (239 lb 4 oz).   Weight management plan: Discussed healthy diet and exercise guidelines        No follow-ups on file.    Parris Hooker is a 59 year old, presenting for the following health issues:  Weight Check, Hypertension, and Anxiety    History of Present Illness       Hypertension: She presents for follow up of hypertension.  She does check blood pressure  regularly outside of the clinic. Outside blood pressures have been over 140/90. She does not follow a low salt diet.     She eats 2-3 servings of fruits and vegetables daily.She consumes 0 sweetened beverage(s) daily.She exercises with enough effort to increase her heart rate 10 to 19 minutes per day.  She exercises with enough effort to increase her heart rate 3 or less days per week.   She is taking medications regularly.     Knees have been bothering her.  Got a shot on Wednesday, helped a lot  Yesterday  Got a shot from Dr. Lowe.      Trying to quit- still doing 6 cigarettes a day.  She is using the nicotine patches        Hypertension Follow-up    Do you check your blood pressure regularly outside of the clinic? Yes   Are you following a low salt diet? Yes  Are your blood pressures ever more than 140 on the top number (systolic) OR more   than 90 on the bottom number (diastolic), for example 140/90? Yes    Anxiety   How are you doing with your anxiety since your last visit? Improved   Are you having other symptoms that might be associated with anxiety? No  Have you had a significant life event? No   Are you feeling depressed? No  Do you have any concerns with your use of alcohol or other drugs? No    Social History     Tobacco Use    Smoking status: Every Day     Current packs/day: 1.00     Average packs/day: 1 pack/day for 30.7 years (30.7 ttl pk-yrs)     Types: Cigarettes     Start date: 3/7/1994     Passive exposure: Current    Smokeless tobacco: Never   Vaping Use    " "Vaping status: Never Used   Substance Use Topics    Alcohol use: Yes     Comment: monthly    Drug use: Never         5/1/2024     8:23 AM 5/17/2024     8:09 AM   RUDY-7 SCORE   Total Score 12 (moderate anxiety) 13 (moderate anxiety)   Total Score 12 13         5/1/2024     8:21 AM 5/17/2024     8:08 AM   PHQ   PHQ-9 Total Score 8 6   Q9: Thoughts of better off dead/self-harm past 2 weeks Not at all  Not at all        Patient-reported         5/17/2024     8:08 AM   Last PHQ-9   1.  Little interest or pleasure in doing things 1    2.  Feeling down, depressed, or hopeless 2    3.  Trouble falling or staying asleep, or sleeping too much 1    4.  Feeling tired or having little energy 1    5.  Poor appetite or overeating 0    6.  Feeling bad about yourself 0    7.  Trouble concentrating 1    8.  Moving slowly or restless 0    Q9: Thoughts of better off dead/self-harm past 2 weeks 0    PHQ-9 Total Score 6       Patient-reported         5/17/2024     8:09 AM   RUDY-7    1. Feeling nervous, anxious, or on edge 3    2. Not being able to stop or control worrying 3    3. Worrying too much about different things 3    4. Trouble relaxing 1    5. Being so restless that it is hard to sit still 0    6. Becoming easily annoyed or irritable 0    7. Feeling afraid, as if something awful might happen 3    RUDY-7 Total Score 13   If you checked any problems, how difficult have they made it for you to do your work, take care of things at home, or get along with other people? Somewhat difficult        Patient-reported         Review of Systems  Constitutional, HEENT, cardiovascular, pulmonary, GI, , musculoskeletal, neuro, skin, endocrine and psych systems are negative, except as otherwise noted.      Objective    BP (!) 152/85 (BP Location: Left arm, Patient Position: Sitting, Cuff Size: Adult Large)   Pulse 79   Temp 98  F (36.7  C) (Tympanic)   Resp 16   Ht 1.6 m (5' 3\")   Wt 108.5 kg (239 lb 4 oz)   SpO2 94%   BMI 42.38 kg/m  "   Body mass index is 42.38 kg/m .  Physical Exam   GENERAL: alert and no distress  EYES: Eyes grossly normal to inspection, PERRL and conjunctivae and sclerae normal  RESP: lungs clear to auscultation - no rales, rhonchi or wheezes  CV: regular rate and rhythm, normal S1 S2, no S3 or S4, no murmur, click or rub, no  MS: no gross musculoskeletal defects noted, no edema  SKIN: no suspicious lesions or rashes  NEURO: Normal strength and tone, mentation intact and speech normal  PSYCH: mentation appears normal, affect normal/bright          Signed Electronically by: Liv Ferrera MD

## 2024-11-08 ENCOUNTER — OFFICE VISIT (OUTPATIENT)
Dept: FAMILY MEDICINE | Facility: OTHER | Age: 59
End: 2024-11-08
Attending: STUDENT IN AN ORGANIZED HEALTH CARE EDUCATION/TRAINING PROGRAM
Payer: COMMERCIAL

## 2024-11-08 VITALS
OXYGEN SATURATION: 94 % | TEMPERATURE: 98 F | HEIGHT: 63 IN | RESPIRATION RATE: 16 BRPM | WEIGHT: 239.25 LBS | HEART RATE: 79 BPM | SYSTOLIC BLOOD PRESSURE: 136 MMHG | BODY MASS INDEX: 42.39 KG/M2 | DIASTOLIC BLOOD PRESSURE: 75 MMHG

## 2024-11-08 DIAGNOSIS — R73.03 PRE-DIABETES: ICD-10-CM

## 2024-11-08 DIAGNOSIS — E66.01 MORBID OBESITY (H): ICD-10-CM

## 2024-11-08 DIAGNOSIS — I10 ESSENTIAL HYPERTENSION: ICD-10-CM

## 2024-11-08 DIAGNOSIS — F41.1 GENERALIZED ANXIETY DISORDER: ICD-10-CM

## 2024-11-08 DIAGNOSIS — E78.5 HYPERLIPIDEMIA LDL GOAL <100: ICD-10-CM

## 2024-11-08 DIAGNOSIS — Z86.39 HISTORY OF VITAMIN D DEFICIENCY: Primary | ICD-10-CM

## 2024-11-08 PROCEDURE — 99214 OFFICE O/P EST MOD 30 MIN: CPT | Performed by: STUDENT IN AN ORGANIZED HEALTH CARE EDUCATION/TRAINING PROGRAM

## 2024-11-08 RX ORDER — LISINOPRIL AND HYDROCHLOROTHIAZIDE 12.5; 2 MG/1; MG/1
1 TABLET ORAL DAILY
Qty: 30 TABLET | Refills: 0 | Status: SHIPPED | OUTPATIENT
Start: 2024-11-08

## 2024-11-08 RX ORDER — VENLAFAXINE HYDROCHLORIDE 37.5 MG/1
37.5 CAPSULE, EXTENDED RELEASE ORAL DAILY
Qty: 30 CAPSULE | Refills: 0 | Status: SHIPPED | OUTPATIENT
Start: 2024-11-08 | End: 2024-11-08 | Stop reason: DRUGHIGH

## 2024-11-08 RX ORDER — VENLAFAXINE 75 MG/1
75 TABLET ORAL 3 TIMES DAILY
Qty: 30 TABLET | Refills: 1 | Status: SHIPPED | OUTPATIENT
Start: 2024-11-08

## 2024-11-08 RX ORDER — ROSUVASTATIN CALCIUM 5 MG/1
5 TABLET, COATED ORAL DAILY
Qty: 30 TABLET | Refills: 1 | Status: SHIPPED | OUTPATIENT
Start: 2024-11-08

## 2024-11-08 ASSESSMENT — PAIN SCALES - GENERAL: PAINLEVEL_OUTOF10: WORST PAIN (10)

## 2024-11-21 ENCOUNTER — MYC REFILL (OUTPATIENT)
Dept: FAMILY MEDICINE | Facility: OTHER | Age: 59
End: 2024-11-21

## 2024-11-21 DIAGNOSIS — M25.562 CHRONIC PAIN OF LEFT KNEE: ICD-10-CM

## 2024-11-21 DIAGNOSIS — G89.29 CHRONIC PAIN OF LEFT KNEE: ICD-10-CM

## 2024-11-21 RX ORDER — GABAPENTIN 300 MG/1
CAPSULE ORAL
Qty: 120 CAPSULE | Refills: 0 | Status: SHIPPED | OUTPATIENT
Start: 2024-11-21

## 2024-11-21 NOTE — TELEPHONE ENCOUNTER
Gabapentin 300 mg      Last Written Prescription Date:  10/22/2024  Last Fill Quantity: 120,   # refills: 0  Last Office Visit: 11/08/2024  Future Office visit:    Next 5 appointments (look out 90 days)      Brayan 10, 2025 10:00 AM  (Arrive by 9:45 AM)  Provider Visit with Liv Ferrera MD  [facility] [place] ([facility] [place] ) [address]  [place] [address]  [phone]             Routing refill request to provider for review/approval because:  Drug not on the [facility], [facility] or [facility] refill protocol or controlled substance

## 2024-11-22 ENCOUNTER — TELEPHONE (OUTPATIENT)
Dept: FAMILY MEDICINE | Facility: OTHER | Age: 59
End: 2024-11-22

## 2024-12-09 DIAGNOSIS — I10 ESSENTIAL HYPERTENSION: ICD-10-CM

## 2024-12-09 RX ORDER — LISINOPRIL AND HYDROCHLOROTHIAZIDE 12.5; 2 MG/1; MG/1
1 TABLET ORAL DAILY
Qty: 30 TABLET | Refills: 0 | Status: SHIPPED | OUTPATIENT
Start: 2024-12-09

## 2024-12-09 NOTE — TELEPHONE ENCOUNTER
Lisinopril- hydrochlorothiazide       Last Written Prescription Date:  11/08/2024  Last Fill Quantity: 30,   # refills: 0  Last Office Visit: 11/08/2024  Future Office visit:    Next 5 appointments (look out 90 days)      Brayan 10, 2025 10:00 AM  (Arrive by 9:45 AM)  Provider Visit with Liv Ferrera MD  [facility] [place] ([facility] [place] ) [address]  [place] [address]  [phone]

## 2024-12-18 ENCOUNTER — MYC REFILL (OUTPATIENT)
Dept: FAMILY MEDICINE | Facility: OTHER | Age: 59
End: 2024-12-18

## 2024-12-18 DIAGNOSIS — G89.29 CHRONIC PAIN OF LEFT KNEE: ICD-10-CM

## 2024-12-18 DIAGNOSIS — M25.562 CHRONIC PAIN OF LEFT KNEE: ICD-10-CM

## 2024-12-19 RX ORDER — GABAPENTIN 300 MG/1
CAPSULE ORAL
Qty: 120 CAPSULE | Refills: 0 | Status: SHIPPED | OUTPATIENT
Start: 2024-12-19

## 2024-12-19 NOTE — TELEPHONE ENCOUNTER
Routing refill request to provider for review/approval because:  Drug not on the [facility], [facility] or [facility] refill protocol or controlled substance

## 2024-12-19 NOTE — TELEPHONE ENCOUNTER
Gabapentin  Last Written Prescription Date: 11/21/24  Last Fill Quantity: 120 # of Refills: 0  Last Office Visit: 11/8/24

## 2025-01-09 NOTE — PROGRESS NOTES
Assessment & Plan     Morbid obesity (H)  BMI of   - tirzepatide-Weight Management (ZEPBOUND) 2.5 MG/0.5ML prefilled pen; Inject 0.5 mLs (2.5 mg) subcutaneously every 7 days for 28 days.    Essential hypertension  BP is still not well controlled  We will try to increase her dose of zestoretic fro 20.12.5 to 20-25.  We will monitor her BMP closely and ensure no significant decline in renal function  We will have her follow-up  in 3-4 weeks for BP check  I recommend avoiding NSAIDs for now.  OK for tylenol.  Continue to maintain adequate hydration  Weight loss will help tremendously  Emphasized DASH diet  NEEDS TO QUIT SMOKING.  This was strongly emphasized today as it will improve her BP  - lisinopril-hydrochlorothiazide (ZESTORETIC) 20-25 MG tablet; Take 1 tablet by mouth daily.  - Albumin Random Urine Quantitative with Creat Ratio; Future  - Comprehensive metabolic panel  - Albumin Random Urine Quantitative with Creat Ratio    Primary osteoarthritis of left knee  MRI of knee done 9/30/2024- she has significant degenerative changes to the left knee.   The right knee is starting to bother her because she is favoring it now over her left knee and compensating  She was recently seen in consultation with . She will undoubtedly need a replacement in the future  She did have injection recently with effects lasting approx 2 weeks.  She is back to baseline pain now and it is quite severe.        Cigarette nicotine dependence without complication  Strongly encouraged smoking cessation  She is working on cutting back.    She tells me that nicotine patches have worked for her in the past. I will send some for her  - nicotine (NICODERM CQ) 7 MG/24HR 24 hr patch; Place 1 patch onto the skin every 24 hours.    Hyperlipidemia LDL goal <100  Not well controlled.    The 10-year ASCVD risk score (Luc DK, et al., 2019) is: 16.5%    Values used to calculate the score:      Age: 59 years      Sex: Female      Is  Non- : No      Diabetic: No      Tobacco smoker: Yes      Systolic Blood Pressure: 164 mmHg      Is BP treated: Yes      HDL Cholesterol: 55 mg/dL      Total Cholesterol: 212 mg/dL  ASCVD score is moderate.  Considering all of her risk factors, she is at high risk in my opinion and should take a daily statin.      She was not fasting today but we will certainly check her lipids at the time of her next physical in May.   Continue statin, she is tolerating this well  Goal <100.  Pending repeat results, may need to increase this  Weight loss will help.  Encourage Mediterranean, anti-inflammatory diet.  - rosuvastatin (CRESTOR) 5 MG tablet; Take 1 tablet (5 mg) by mouth daily.  - Comprehensive metabolic panel    History of vitamin D deficiency  She was quite low 5/17/24.     Recommend she continue taking 5,000 units daily of vitamin D3  - Vitamin D Deficiency        Return in about 4 weeks (around 2/7/2025) for Follow up.    Parris Hooker is a 59 year old, presenting for the following health issues:  Hypertension and Anxiety        1/10/2025    10:00 AM   Additional Questions   Roomed by Haley Clarke   Accompanied by None         1/10/2025    10:00 AM   Patient Reported Additional Medications   Patient reports taking the following new medications None     HPI     Both knees bothering her.  Had injection into left knee by Dr. Lowe in October. Injection only lasted 2 weeks.   Willing to get injection again  Knee pain is about 9/10, nothing seems to be helping.    When she gets home from work she can barely move.    Hard to get out of bed  Still continues to smoke 6-8 cigarettes a day      Hypertension Follow-up    Do you check your blood pressure regularly outside of the clinic? Yes   Are you following a low salt diet? Yes  Are your blood pressures ever more than 140 on the top number (systolic) OR more   than 90 on the bottom number (diastolic), for example 140/90? No    Anxiety   How  are you doing with your anxiety since your last visit? No change  Are you having other symptoms that might be associated with anxiety? Yes:  Pain in knees makes her anxiety worse   Have you had a significant life event? No   Are you feeling depressed? No  Do you have any concerns with your use of alcohol or other drugs? No    Social History     Tobacco Use    Smoking status: Every Day     Current packs/day: 1.00     Average packs/day: 1 pack/day for 30.8 years (30.8 ttl pk-yrs)     Types: Cigarettes     Start date: 3/7/1994     Passive exposure: Current    Smokeless tobacco: Never   Vaping Use    Vaping status: Never Used   Substance Use Topics    Alcohol use: Yes     Comment: monthly    Drug use: Never         5/1/2024     8:23 AM 5/17/2024     8:09 AM   RUDY-7 SCORE   Total Score 12 (moderate anxiety) 13 (moderate anxiety)   Total Score 12 13         5/1/2024     8:21 AM 5/17/2024     8:08 AM   PHQ   PHQ-9 Total Score 8 6   Q9: Thoughts of better off dead/self-harm past 2 weeks Not at all Not at all         5/17/2024     8:08 AM   Last PHQ-9   1.  Little interest or pleasure in doing things 1   2.  Feeling down, depressed, or hopeless 2   3.  Trouble falling or staying asleep, or sleeping too much 1   4.  Feeling tired or having little energy 1   5.  Poor appetite or overeating 0   6.  Feeling bad about yourself 0   7.  Trouble concentrating 1   8.  Moving slowly or restless 0   Q9: Thoughts of better off dead/self-harm past 2 weeks 0   PHQ-9 Total Score 6         5/17/2024     8:09 AM   RUDY-7    1. Feeling nervous, anxious, or on edge 3   2. Not being able to stop or control worrying 3   3. Worrying too much about different things 3   4. Trouble relaxing 1   5. Being so restless that it is hard to sit still 0   6. Becoming easily annoyed or irritable 0   7. Feeling afraid, as if something awful might happen 3   RUDY-7 Total Score 13   If you checked any problems, how difficult have they made it for you to do your  "work, take care of things at home, or get along with other people? Somewhat difficult       Hyperlipidemia Follow-Up    Are you regularly taking any medication or supplement to lower your cholesterol?   Yes- rosuvastatin  Are you having muscle aches or other side effects that you think could be caused by your cholesterol lowering medication?  No        Review of Systems  Constitutional, HEENT, cardiovascular, pulmonary, GI, , musculoskeletal, neuro, skin, endocrine and psych systems are negative, except as otherwise noted.      Objective    /84 (BP Location: Left arm, Patient Position: Sitting, Cuff Size: Adult Regular)   Pulse 76   Temp 97.5  F (36.4  C) (Tympanic)   Resp 16   Ht 1.6 m (5' 3\")   Wt 106.9 kg (235 lb 9 oz)   SpO2 95%   BMI 41.73 kg/m    Body mass index is 41.73 kg/m .  Physical Exam   GENERAL: alert and no distress  EYES: Eyes grossly normal to inspection, PERRL and conjunctivae and sclerae normal  HENT: ear canals and TM's normal, nose and mouth without ulcers or lesions  NECK: no adenopathy, no asymmetry, masses, or scars  RESP: lungs clear to auscultation - no rales, rhonchi or wheezes  CV: regular rate and rhythm, normal S1 S2, no S3 or S4, no murmur, click or rub, no peripheral edema  ABDOMEN: soft, nontender, no hepatosplenomegaly, no masses and bowel sounds normal  MS: no gross musculoskeletal defects noted, no edema  SKIN: no suspicious lesions or rashes  NEURO: Normal strength and tone, mentation intact and speech normal  PSYCH: mentation appears normal, affect normal/bright          Signed Electronically by: Liv Ferrera MD    "

## 2025-01-10 ENCOUNTER — OFFICE VISIT (OUTPATIENT)
Dept: FAMILY MEDICINE | Facility: OTHER | Age: 60
End: 2025-01-10
Attending: STUDENT IN AN ORGANIZED HEALTH CARE EDUCATION/TRAINING PROGRAM
Payer: COMMERCIAL

## 2025-01-10 VITALS
TEMPERATURE: 97.5 F | HEART RATE: 76 BPM | HEIGHT: 63 IN | OXYGEN SATURATION: 95 % | RESPIRATION RATE: 16 BRPM | DIASTOLIC BLOOD PRESSURE: 88 MMHG | WEIGHT: 235.56 LBS | SYSTOLIC BLOOD PRESSURE: 164 MMHG | BODY MASS INDEX: 41.74 KG/M2

## 2025-01-10 DIAGNOSIS — I10 ESSENTIAL HYPERTENSION: ICD-10-CM

## 2025-01-10 DIAGNOSIS — E78.5 HYPERLIPIDEMIA LDL GOAL <100: ICD-10-CM

## 2025-01-10 DIAGNOSIS — Z86.39 HISTORY OF VITAMIN D DEFICIENCY: ICD-10-CM

## 2025-01-10 DIAGNOSIS — E66.01 MORBID OBESITY (H): Primary | ICD-10-CM

## 2025-01-10 DIAGNOSIS — M17.12 PRIMARY OSTEOARTHRITIS OF LEFT KNEE: ICD-10-CM

## 2025-01-10 DIAGNOSIS — F17.210 CIGARETTE NICOTINE DEPENDENCE WITHOUT COMPLICATION: ICD-10-CM

## 2025-01-10 LAB
ALBUMIN SERPL BCG-MCNC: 4.4 G/DL (ref 3.5–5.2)
ALP SERPL-CCNC: 77 U/L (ref 40–150)
ALT SERPL W P-5'-P-CCNC: 24 U/L (ref 0–50)
ANION GAP SERPL CALCULATED.3IONS-SCNC: 16 MMOL/L (ref 7–15)
AST SERPL W P-5'-P-CCNC: 20 U/L (ref 0–45)
BILIRUB SERPL-MCNC: 0.5 MG/DL
BUN SERPL-MCNC: 29 MG/DL (ref 8–23)
CALCIUM SERPL-MCNC: 10.1 MG/DL (ref 8.8–10.4)
CHLORIDE SERPL-SCNC: 98 MMOL/L (ref 98–107)
CREAT SERPL-MCNC: 0.91 MG/DL (ref 0.51–0.95)
CREAT UR-MCNC: 65.9 MG/DL
EGFRCR SERPLBLD CKD-EPI 2021: 71 ML/MIN/1.73M2
GLUCOSE SERPL-MCNC: 104 MG/DL (ref 70–99)
HCO3 SERPL-SCNC: 24 MMOL/L (ref 22–29)
MICROALBUMIN UR-MCNC: 14.6 MG/L
MICROALBUMIN/CREAT UR: 22.15 MG/G CR (ref 0–25)
POTASSIUM SERPL-SCNC: 4.4 MMOL/L (ref 3.4–5.3)
PROT SERPL-MCNC: 8.3 G/DL (ref 6.4–8.3)
SODIUM SERPL-SCNC: 138 MMOL/L (ref 135–145)
VIT D+METAB SERPL-MCNC: 26 NG/ML (ref 20–50)

## 2025-01-10 RX ORDER — LISINOPRIL AND HYDROCHLOROTHIAZIDE 20; 25 MG/1; MG/1
1 TABLET ORAL DAILY
Qty: 30 TABLET | Refills: 1 | Status: SHIPPED | OUTPATIENT
Start: 2025-01-10

## 2025-01-10 RX ORDER — ROSUVASTATIN CALCIUM 5 MG/1
5 TABLET, COATED ORAL DAILY
Qty: 30 TABLET | Refills: 1 | Status: SHIPPED | OUTPATIENT
Start: 2025-01-10

## 2025-01-10 ASSESSMENT — PAIN SCALES - GENERAL: PAINLEVEL_OUTOF10: SEVERE PAIN (9)

## 2025-01-21 ENCOUNTER — MYC REFILL (OUTPATIENT)
Dept: FAMILY MEDICINE | Facility: OTHER | Age: 60
End: 2025-01-21

## 2025-01-21 DIAGNOSIS — M25.562 CHRONIC PAIN OF LEFT KNEE: ICD-10-CM

## 2025-01-21 DIAGNOSIS — G89.29 CHRONIC PAIN OF LEFT KNEE: ICD-10-CM

## 2025-01-22 ENCOUNTER — TELEPHONE (OUTPATIENT)
Dept: FAMILY MEDICINE | Facility: OTHER | Age: 60
End: 2025-01-22

## 2025-01-22 RX ORDER — GABAPENTIN 300 MG/1
CAPSULE ORAL
Qty: 120 CAPSULE | Refills: 0 | Status: SHIPPED | OUTPATIENT
Start: 2025-01-22

## 2025-01-22 NOTE — TELEPHONE ENCOUNTER
Received APPROVAL regarding tirzepatide-Weight Management (ZEPBOUND) 2.5 MG/0.5ML prefilled pen  from [employer] effective 1/1/25-9/19/25

## 2025-02-12 ENCOUNTER — OFFICE VISIT (OUTPATIENT)
Dept: ORTHOPEDICS | Facility: OTHER | Age: 60
End: 2025-02-12
Attending: ORTHOPAEDIC SURGERY
Payer: COMMERCIAL

## 2025-02-12 VITALS
DIASTOLIC BLOOD PRESSURE: 90 MMHG | WEIGHT: 235.01 LBS | HEART RATE: 93 BPM | BODY MASS INDEX: 41.63 KG/M2 | SYSTOLIC BLOOD PRESSURE: 163 MMHG | OXYGEN SATURATION: 95 % | RESPIRATION RATE: 16 BRPM

## 2025-02-12 DIAGNOSIS — M17.11 PRIMARY LOCALIZED OSTEOARTHROSIS OF RIGHT LOWER LEG: Primary | ICD-10-CM

## 2025-02-12 DIAGNOSIS — M17.12 PRIMARY LOCALIZED OSTEOARTHROSIS OF LEFT LOWER LEG: ICD-10-CM

## 2025-02-12 RX ORDER — BETAMETHASONE SODIUM PHOSPHATE AND BETAMETHASONE ACETATE 3; 3 MG/ML; MG/ML
12 INJECTION, SUSPENSION INTRA-ARTICULAR; INTRALESIONAL; INTRAMUSCULAR; SOFT TISSUE ONCE
Status: COMPLETED | OUTPATIENT
Start: 2025-02-12 | End: 2025-02-12

## 2025-02-12 RX ORDER — TIRZEPATIDE 2.5 MG/.5ML
INJECTION, SOLUTION SUBCUTANEOUS
COMMUNITY
Start: 2025-01-24

## 2025-02-12 RX ADMIN — BETAMETHASONE SODIUM PHOSPHATE AND BETAMETHASONE ACETATE 12 MG: 3; 3 INJECTION, SUSPENSION INTRA-ARTICULAR; INTRALESIONAL; INTRAMUSCULAR; SOFT TISSUE at 11:32

## 2025-02-12 RX ADMIN — BETAMETHASONE SODIUM PHOSPHATE AND BETAMETHASONE ACETATE 12 MG: 3; 3 INJECTION, SUSPENSION INTRA-ARTICULAR; INTRALESIONAL; INTRAMUSCULAR; SOFT TISSUE at 11:33

## 2025-02-12 ASSESSMENT — PAIN SCALES - GENERAL: PAINLEVEL_OUTOF10: SEVERE PAIN (9)

## 2025-02-19 ENCOUNTER — MYC REFILL (OUTPATIENT)
Dept: FAMILY MEDICINE | Facility: OTHER | Age: 60
End: 2025-02-19

## 2025-02-19 DIAGNOSIS — F17.210 CIGARETTE NICOTINE DEPENDENCE WITHOUT COMPLICATION: Primary | ICD-10-CM

## 2025-02-19 DIAGNOSIS — G89.29 CHRONIC PAIN OF LEFT KNEE: ICD-10-CM

## 2025-02-19 DIAGNOSIS — E66.01 MORBID OBESITY (H): ICD-10-CM

## 2025-02-19 DIAGNOSIS — M25.562 CHRONIC PAIN OF LEFT KNEE: ICD-10-CM

## 2025-02-20 RX ORDER — GABAPENTIN 300 MG/1
CAPSULE ORAL
Qty: 120 CAPSULE | Refills: 0 | Status: SHIPPED | OUTPATIENT
Start: 2025-02-20

## 2025-02-20 RX ORDER — TIRZEPATIDE 2.5 MG/.5ML
2.5 INJECTION, SOLUTION SUBCUTANEOUS
Qty: 2 ML | Refills: 0 | OUTPATIENT
Start: 2025-02-20

## 2025-02-20 NOTE — TELEPHONE ENCOUNTER
Rx last filled on 1/10/25.    RN CC called patient to inquire if patient is to increase dose. Patient is unavailable until around 4 PM today.

## 2025-02-20 NOTE — TELEPHONE ENCOUNTER
Zepbound      Last Written Prescription Date:  Historical  Last Fill Quantity: unknown,   # refills: unknown  Last Office Visit: 02/07/25  Future Office visit:    Next 5 appointments (look out 90 days)      May 09, 2025 8:30 AM  (Arrive by 8:15 AM)  Provider Visit with Liv Ferrera MD  [facility] [place] ([facility] [place] ) [address]  [place] [address]  [phone]             Routing refill request to provider for review/approval because:  Medication is reported/historical

## 2025-02-20 NOTE — TELEPHONE ENCOUNTER
Gabapentin      Last Written Prescription Date:  01/22/25  Last Fill Quantity: 120,   # refills: 0  Last Office Visit: 02/07/25  Future Office visit:    Next 5 appointments (look out 90 days)      May 09, 2025 8:30 AM  (Arrive by 8:15 AM)  Provider Visit with Liv Ferrera MD  [facility] [place] ([facility] [place] ) [address]  [place] [address]  [phone]

## 2025-03-12 DIAGNOSIS — I10 ESSENTIAL HYPERTENSION: ICD-10-CM

## 2025-03-12 DIAGNOSIS — F41.1 GENERALIZED ANXIETY DISORDER: ICD-10-CM

## 2025-03-12 DIAGNOSIS — E78.5 HYPERLIPIDEMIA LDL GOAL <100: ICD-10-CM

## 2025-03-12 NOTE — TELEPHONE ENCOUNTER
Zestoretic      Last Written Prescription Date:  1/10/25  Last Fill Quantity: 30,   # refills: 1  Last Office Visit: 2/7/25  Future Office visit:    Next 5 appointments (look out 90 days)      May 09, 2025 8:30 AM  (Arrive by 8:15 AM)  Provider Visit with Liv Ferrera MD  [facility] ([facility] ) [address]  [place] [address]  [phone]             Routing refill request to provider for review/approval because:      Crestor      Last Written Prescription Date:  1/10/25  Last Fill Quantity: 30,   # refills: 1  Last Office Visit: 2/7/25  Future Office visit:    Next 5 appointments (look out 90 days)      May 09, 2025 8:30 AM  (Arrive by 8:15 AM)  Provider Visit with Liv Ferrera MD  [facility] ([facility] ) [address]  [place] [address]  [phone]             Routing refill request to provider for review/approval because:      Effexor      Last Written Prescription Date:  1/10/25  Last Fill Quantity: 30,   # refills: 1  Last Office Visit: 2/7/25  Future Office visit:    Next 5 appointments (look out 90 days)      May 09, 2025 8:30 AM  (Arrive by 8:15 AM)  Provider Visit with Liv Ferrera MD  [facility] ([facility] [place] ) [address]  [place] [address]  [phone]             Routing refill request to provider for review/approval because:

## 2025-03-13 RX ORDER — ROSUVASTATIN CALCIUM 5 MG/1
5 TABLET, COATED ORAL DAILY
Qty: 30 TABLET | Refills: 0 | Status: SHIPPED | OUTPATIENT
Start: 2025-03-13

## 2025-03-13 RX ORDER — LISINOPRIL AND HYDROCHLOROTHIAZIDE 20; 25 MG/1; MG/1
1 TABLET ORAL DAILY
Qty: 30 TABLET | Refills: 0 | Status: SHIPPED | OUTPATIENT
Start: 2025-03-13

## 2025-03-13 RX ORDER — VENLAFAXINE 75 MG/1
75 TABLET ORAL 3 TIMES DAILY
Qty: 30 TABLET | Refills: 0 | Status: SHIPPED | OUTPATIENT
Start: 2025-03-13

## 2025-03-13 NOTE — TELEPHONE ENCOUNTER
lisinopril-hydrochlorothiazide (ZESTORETIC) 20-25 MG     Routing refill request to provider for review/approval because:  Diuretics (Including Combos) Protocol Failed    Rerun Protocol (3/12/2025 9:06 AM)    Most recent blood pressure under 140/90 in past 12 months        BP Readings from Last 3 Encounters:   02/12/25 (!) 163/90   02/07/25 128/76   01/10/25 164/88      No data recorded        venlafaxine (EFFEXOR) 75 MG     Routing refill request to provider for review/approval because:  Serotonin-Norepinephrine Reuptake Inhibitors  Failed    Rerun Protocol (3/12/2025 9:06 AM)    Most recent blood pressure under 140/90 in past 12 months        BP Readings from Last 3 Encounters:   02/12/25 (!) 163/90   02/07/25 128/76   01/10/25 164/88      No data recorded

## 2025-03-22 ENCOUNTER — MYC REFILL (OUTPATIENT)
Dept: FAMILY MEDICINE | Facility: OTHER | Age: 60
End: 2025-03-22

## 2025-03-22 DIAGNOSIS — M25.562 CHRONIC PAIN OF LEFT KNEE: ICD-10-CM

## 2025-03-22 DIAGNOSIS — G89.29 CHRONIC PAIN OF LEFT KNEE: ICD-10-CM

## 2025-03-22 DIAGNOSIS — E66.01 MORBID OBESITY (H): Primary | ICD-10-CM

## 2025-03-22 RX ORDER — TIRZEPATIDE 2.5 MG/.5ML
INJECTION, SOLUTION SUBCUTANEOUS
Status: CANCELLED | OUTPATIENT
Start: 2025-03-22

## 2025-03-24 RX ORDER — GABAPENTIN 300 MG/1
CAPSULE ORAL
Qty: 120 CAPSULE | Refills: 0 | Status: SHIPPED | OUTPATIENT
Start: 2025-03-24

## 2025-03-24 NOTE — TELEPHONE ENCOUNTER
Pt called and message left to call us back.  Will need to know if pt is having any negative side effects and if she is ready to go up to then next dose when she calls back.

## 2025-03-24 NOTE — TELEPHONE ENCOUNTER
gabapentin (NEURONTIN) 300 MG       Last Written Prescription Date:  02/20/2025  Last Fill Quantity: 120,   # refills: 0  Last Office Visit: 02/07/2025  Future Office visit:    Next 5 appointments (look out 90 days)      May 09, 2025 8:30 AM  (Arrive by 8:15 AM)  Provider Visit with Liv Ferrera MD  [facility] [place] ([facility] [place] ) [address]  [place] [address]  [phone]             Routing refill request to provider for review/approval because:  Drug not on the [facility], P or [facility] refill protocol or controlled substance

## 2025-03-25 ENCOUNTER — MYC MEDICAL ADVICE (OUTPATIENT)
Dept: FAMILY MEDICINE | Facility: OTHER | Age: 60
End: 2025-03-25

## 2025-03-26 NOTE — TELEPHONE ENCOUNTER
Pt called and scheduled.    Future Office visit:    Next 5 appointments (look out 90 days)      Apr 04, 2025 3:00 PM  (Arrive by 2:45 PM)  Provider Visit with Liv Ferrera MD  [facility] [place] ([facility] [place] ) [address]  [place] [address]  [phone]     May 23, 2025 9:00 AM  (Arrive by 8:45 AM)  Provider Visit with Liv Ferrera MD  [facility] [place] ([facility] [place] ) [address]  [place] [address]  [phone]

## 2025-03-26 NOTE — TELEPHONE ENCOUNTER
Zafar Lopez, sounds good.  I have not seen in since January.  Need to see her early April.  Ideally first to second week of April for follow-up

## 2025-03-26 NOTE — TELEPHONE ENCOUNTER
Pt called and is ready to go to the 7.5 mg dose of Zepbound.  Pt denies any negative side effects from her current dose and reports tolerated the 5 mg dose well.     Pended Zepbound 7.5 mg dose.   Please review and sign if appropriate.

## 2025-04-04 ENCOUNTER — OFFICE VISIT (OUTPATIENT)
Dept: FAMILY MEDICINE | Facility: OTHER | Age: 60
End: 2025-04-04
Attending: STUDENT IN AN ORGANIZED HEALTH CARE EDUCATION/TRAINING PROGRAM
Payer: COMMERCIAL

## 2025-04-04 VITALS
SYSTOLIC BLOOD PRESSURE: 154 MMHG | RESPIRATION RATE: 18 BRPM | WEIGHT: 222 LBS | HEART RATE: 100 BPM | DIASTOLIC BLOOD PRESSURE: 88 MMHG | OXYGEN SATURATION: 98 % | HEIGHT: 63 IN | TEMPERATURE: 98 F | BODY MASS INDEX: 39.34 KG/M2

## 2025-04-04 DIAGNOSIS — I10 ESSENTIAL HYPERTENSION: ICD-10-CM

## 2025-04-04 DIAGNOSIS — E66.01 MORBID OBESITY (H): Primary | ICD-10-CM

## 2025-04-04 LAB
ANION GAP SERPL CALCULATED.3IONS-SCNC: 10 MMOL/L (ref 7–15)
BUN SERPL-MCNC: 13.5 MG/DL (ref 8–23)
CALCIUM SERPL-MCNC: 10.6 MG/DL (ref 8.8–10.4)
CHLORIDE SERPL-SCNC: 91 MMOL/L (ref 98–107)
CREAT SERPL-MCNC: 0.76 MG/DL (ref 0.51–0.95)
EGFRCR SERPLBLD CKD-EPI 2021: 89 ML/MIN/1.73M2
GLUCOSE SERPL-MCNC: 90 MG/DL (ref 70–99)
HCO3 SERPL-SCNC: 29 MMOL/L (ref 22–29)
POTASSIUM SERPL-SCNC: 4.4 MMOL/L (ref 3.4–5.3)
SODIUM SERPL-SCNC: 130 MMOL/L (ref 135–145)

## 2025-04-04 PROCEDURE — 3077F SYST BP >= 140 MM HG: CPT | Performed by: STUDENT IN AN ORGANIZED HEALTH CARE EDUCATION/TRAINING PROGRAM

## 2025-04-04 PROCEDURE — 80048 BASIC METABOLIC PNL TOTAL CA: CPT | Performed by: STUDENT IN AN ORGANIZED HEALTH CARE EDUCATION/TRAINING PROGRAM

## 2025-04-04 PROCEDURE — 1125F AMNT PAIN NOTED PAIN PRSNT: CPT | Performed by: STUDENT IN AN ORGANIZED HEALTH CARE EDUCATION/TRAINING PROGRAM

## 2025-04-04 PROCEDURE — 36415 COLL VENOUS BLD VENIPUNCTURE: CPT | Performed by: STUDENT IN AN ORGANIZED HEALTH CARE EDUCATION/TRAINING PROGRAM

## 2025-04-04 PROCEDURE — 3079F DIAST BP 80-89 MM HG: CPT | Performed by: STUDENT IN AN ORGANIZED HEALTH CARE EDUCATION/TRAINING PROGRAM

## 2025-04-04 PROCEDURE — 99213 OFFICE O/P EST LOW 20 MIN: CPT | Performed by: STUDENT IN AN ORGANIZED HEALTH CARE EDUCATION/TRAINING PROGRAM

## 2025-04-04 ASSESSMENT — PAIN SCALES - GENERAL: PAINLEVEL_OUTOF10: SEVERE PAIN (9)

## 2025-04-04 NOTE — PROGRESS NOTES
"  {PROVIDER CHARTING PREFERENCE:[id number]}    Parris Hooker is a 59 year old, presenting for the following health issues:  Recheck Medication        4/4/2025     3:00 PM   Additional Questions   Roomed by Nancy DILLON   Accompanied by self         4/4/2025     3:00 PM   Patient Reported Additional Medications   Patient reports taking the following new medications none     History of Present Illness       Hypertension: She presents for follow up of hypertension.  She does check blood pressure  regularly outside of the clinic. Outside blood pressures have been over 140/90. She follows a low salt diet.     She eats 2-3 servings of fruits and vegetables daily.She consumes 0 sweetened beverage(s) daily.She exercises with enough effort to increase her heart rate 9 or less minutes per day.  She exercises with enough effort to increase her heart rate 3 or less days per week.   She is taking medications regularly.        BP is 164/88- has not been checking her BP at home recently.      Medication Followup of Zepbound  Taking Medication as prescribed: yes  Side Effects:  None  Medication Helping Symptoms:  yes    Hypertension Follow-up    Do you check your blood pressure regularly outside of the clinic? Yes   Are you following a low salt diet? Yes  Are your blood pressures ever more than 140 on the top number (systolic) OR more   than 90 on the bottom number (diastolic), for example 140/90? No    BP Readings from Last 2 Encounters:   04/04/25 (!) 154/88   02/12/25 (!) 163/90       Review of Systems  Constitutional, HEENT, cardiovascular, pulmonary, GI, , musculoskeletal, neuro, skin, endocrine and psych systems are negative, except as otherwise noted.      Objective    BP (!) 154/88 (BP Location: Right arm, Patient Position: Sitting, Cuff Size: Adult Large)   Pulse 100   Temp 98  F (36.7  C) (Tympanic)   Resp 18   Ht 1.6 m (5' 3\")   Wt 100.7 kg (222 lb)   SpO2 98%   BMI 39.33 kg/m    Body mass index is 39.33 " kg/m .  Physical Exam   GENERAL: alert and no distress  EYES: Eyes grossly normal to inspection, PERRL and conjunctivae and sclerae normal  HENT: ear canals and TM's normal, nose and mouth without ulcers or lesions  NECK: no adenopathy, no asymmetry, masses, or scars  RESP: lungs clear to auscultation - no rales, rhonchi or wheezes  CV: regular rate and rhythm, normal S1 S2, no S3 or S4, no murmur, click or rub, no peripheral edema  ABDOMEN: soft, nontender, no hepatosplenomegaly, no masses and bowel sounds normal  MS: no gross musculoskeletal defects noted, no edema  SKIN: no suspicious lesions or rashes  NEURO: Normal strength and tone, mentation intact and speech normal  PSYCH: mentation appears normal, affect normal/bright        Signed Electronically by: Liv Ferrera MD     normal, affect normal/bright        Signed Electronically by: Liv Ferrera MD

## 2025-04-14 DIAGNOSIS — F41.1 GENERALIZED ANXIETY DISORDER: ICD-10-CM

## 2025-04-14 DIAGNOSIS — I10 ESSENTIAL HYPERTENSION: ICD-10-CM

## 2025-04-14 DIAGNOSIS — E78.5 HYPERLIPIDEMIA LDL GOAL <100: ICD-10-CM

## 2025-04-14 RX ORDER — AMLODIPINE BESYLATE 2.5 MG/1
2.5 TABLET ORAL DAILY
Qty: 30 TABLET | Refills: 1 | Status: SHIPPED | OUTPATIENT
Start: 2025-04-14

## 2025-04-14 NOTE — TELEPHONE ENCOUNTER
lisinopril-hydrochlorothiazide (ZESTORETIC) 20-25 MG tablet 30 tablet 0 3/13/2025     rosuvastatin (CRESTOR) 5 MG tablet 30 tablet 0 3/13/2025     venlafaxine (EFFEXOR) 75 MG tablet 30 tablet 0 3/13/2025     Last Office Visit: 4/4/2025  Future Office visit:    Next 5 appointments (look out 90 days)      May 23, 2025 9:00 AM  (Arrive by 8:45 AM)  Provider Visit with Liv Ferrera MD  [facility] [place] ([facility] [place] ) [address]  [place] [address]  [phone]             Routing refill request to provider for review/approval because:

## 2025-04-15 RX ORDER — VENLAFAXINE 75 MG/1
75 TABLET ORAL 3 TIMES DAILY
Qty: 30 TABLET | Refills: 0 | Status: SHIPPED | OUTPATIENT
Start: 2025-04-15

## 2025-04-15 RX ORDER — ROSUVASTATIN CALCIUM 5 MG/1
5 TABLET, COATED ORAL DAILY
Qty: 30 TABLET | Refills: 0 | Status: SHIPPED | OUTPATIENT
Start: 2025-04-15

## 2025-04-15 RX ORDER — LISINOPRIL AND HYDROCHLOROTHIAZIDE 20; 25 MG/1; MG/1
1 TABLET ORAL DAILY
Qty: 30 TABLET | Refills: 0 | Status: SHIPPED | OUTPATIENT
Start: 2025-04-15

## 2025-04-16 ENCOUNTER — MYC REFILL (OUTPATIENT)
Dept: FAMILY MEDICINE | Facility: OTHER | Age: 60
End: 2025-04-16

## 2025-04-16 DIAGNOSIS — E66.01 MORBID OBESITY (H): ICD-10-CM

## 2025-04-17 NOTE — TELEPHONE ENCOUNTER
tirzepatide-Weight Management (ZEPBOUND) 7.5 MG/0.5ML prefilled pen       Last Written Prescription Date:  3/26/25  Last Fill Quantity: 2 mL,   # refills: 0  Last Office Visit: 4/4/25  Future Office visit:    Next 5 appointments (look out 90 days)      May 23, 2025 9:00 AM  (Arrive by 8:45 AM)  Provider Visit with Liv Ferrera MD  [facility] ([facility] [place] ) [address]  [place] [address]  [phone]             Routing refill request to provider for review/approval because:  Drug not on the [facility], P or [facility] refill protocol or controlled substance

## 2025-05-14 DIAGNOSIS — E66.01 MORBID OBESITY (H): ICD-10-CM

## 2025-05-14 DIAGNOSIS — E78.5 HYPERLIPIDEMIA LDL GOAL <100: ICD-10-CM

## 2025-05-14 DIAGNOSIS — I10 ESSENTIAL HYPERTENSION: ICD-10-CM

## 2025-05-14 DIAGNOSIS — F41.1 GENERALIZED ANXIETY DISORDER: ICD-10-CM

## 2025-05-14 RX ORDER — ROSUVASTATIN CALCIUM 5 MG/1
5 TABLET, COATED ORAL DAILY
Qty: 30 TABLET | Refills: 0 | Status: SHIPPED | OUTPATIENT
Start: 2025-05-14

## 2025-05-14 RX ORDER — TIRZEPATIDE 7.5 MG/.5ML
INJECTION, SOLUTION SUBCUTANEOUS
Qty: 2 ML | Refills: 0 | OUTPATIENT
Start: 2025-05-14

## 2025-05-14 RX ORDER — LISINOPRIL AND HYDROCHLOROTHIAZIDE 20; 25 MG/1; MG/1
1 TABLET ORAL DAILY
Qty: 30 TABLET | Refills: 0 | Status: SHIPPED | OUTPATIENT
Start: 2025-05-14

## 2025-05-14 RX ORDER — VENLAFAXINE 75 MG/1
75 TABLET ORAL 3 TIMES DAILY
Qty: 30 TABLET | Refills: 4 | Status: SHIPPED | OUTPATIENT
Start: 2025-05-14

## 2025-05-14 NOTE — TELEPHONE ENCOUNTER
Velafaxine       Last Written Prescription Date:  4/15/25  Last Fill Quantity: 30,   # refills: 0  Last Office Visit: 4/4/25  Future Office visit:    Next 5 appointments (look out 90 days)      May 23, 2025 9:00 AM  (Arrive by 8:45 AM)  Provider Visit with Liv Ferrera MD  [facility] ([facility] ) [address]  [place] [address]  [phone]             Routing refill request to provider for review/approval because:  BP Readings from Last 3 Encounters:   04/04/25 (!) 154/88   02/12/25 (!) 163/90   02/07/25 128/76        lisinopril-hydrochlorothiazide     Last Written Prescription Date:  4/15/25  Last Fill Quantity: 30,   # refills: 0  Last Office Visit: 4/4/25  Future Office visit:    Next 5 appointments (look out 90 days)      May 23, 2025 9:00 AM  (Arrive by 8:45 AM)  Provider Visit with Liv Ferrera MD  [facility] ([facility] ) [address]  [place] [address]  [phone]             Routing refill request to provider for review/approval because:  BP    ZEPBOUND) 7.5 MG/0.5ML prefilled pen       Last Written Prescription Date:  4/17/25  Last Fill Quantity: 2,   # refills: 0  Last Office Visit: 4/4/25  Future Office visit:    Next 5 appointments (look out 90 days)      May 23, 2025 9:00 AM  (Arrive by 8:45 AM)  Provider Visit with Liv Ferrera MD  [facility] ([facility] ) [address]  [place] [address]  [phone]             Routing refill request to provider for review/approval because:  Drug not on the G, P or [facility] refill protocol or controlled substance    Patient reports doing well, denies any side effects, pt reports she wants to increase to the next dose.

## 2025-05-23 ENCOUNTER — RESULTS FOLLOW-UP (OUTPATIENT)
Dept: FAMILY MEDICINE | Facility: OTHER | Age: 60
End: 2025-05-23

## 2025-05-23 ENCOUNTER — OFFICE VISIT (OUTPATIENT)
Dept: FAMILY MEDICINE | Facility: OTHER | Age: 60
End: 2025-05-23
Attending: STUDENT IN AN ORGANIZED HEALTH CARE EDUCATION/TRAINING PROGRAM
Payer: COMMERCIAL

## 2025-05-23 VITALS
OXYGEN SATURATION: 98 % | DIASTOLIC BLOOD PRESSURE: 88 MMHG | BODY MASS INDEX: 35.66 KG/M2 | TEMPERATURE: 97.2 F | WEIGHT: 201.3 LBS | HEART RATE: 97 BPM | SYSTOLIC BLOOD PRESSURE: 138 MMHG

## 2025-05-23 DIAGNOSIS — E66.01 MORBID OBESITY (H): ICD-10-CM

## 2025-05-23 DIAGNOSIS — Z00.00 ROUTINE HISTORY AND PHYSICAL EXAMINATION OF ADULT: ICD-10-CM

## 2025-05-23 DIAGNOSIS — R73.03 PREDIABETES: ICD-10-CM

## 2025-05-23 DIAGNOSIS — R60.0 BILATERAL LOWER EXTREMITY EDEMA: ICD-10-CM

## 2025-05-23 DIAGNOSIS — F41.1 GENERALIZED ANXIETY DISORDER: Primary | ICD-10-CM

## 2025-05-23 DIAGNOSIS — E55.9 VITAMIN D DEFICIENCY: ICD-10-CM

## 2025-05-23 DIAGNOSIS — Z12.11 SCREENING FOR MALIGNANT NEOPLASM OF COLON: ICD-10-CM

## 2025-05-23 DIAGNOSIS — F17.210 CIGARETTE NICOTINE DEPENDENCE WITHOUT COMPLICATION: ICD-10-CM

## 2025-05-23 DIAGNOSIS — E78.5 HYPERLIPIDEMIA LDL GOAL <100: ICD-10-CM

## 2025-05-23 DIAGNOSIS — Z78.0 POST-MENOPAUSE: ICD-10-CM

## 2025-05-23 DIAGNOSIS — I10 ESSENTIAL HYPERTENSION: ICD-10-CM

## 2025-05-23 DIAGNOSIS — M25.562 CHRONIC PAIN OF LEFT KNEE: ICD-10-CM

## 2025-05-23 DIAGNOSIS — G89.29 CHRONIC PAIN OF LEFT KNEE: ICD-10-CM

## 2025-05-23 LAB
CHOLEST SERPL-MCNC: 144 MG/DL
CREAT UR-MCNC: 96.8 MG/DL
EST. AVERAGE GLUCOSE BLD GHB EST-MCNC: 108 MG/DL
FASTING STATUS PATIENT QL REPORTED: YES
HBA1C MFR BLD: 5.4 %
HDLC SERPL-MCNC: 52 MG/DL
LDLC SERPL CALC-MCNC: 77 MG/DL
MICROALBUMIN UR-MCNC: 18.6 MG/L
MICROALBUMIN/CREAT UR: 19.21 MG/G CR (ref 0–25)
NONHDLC SERPL-MCNC: 92 MG/DL
TRIGL SERPL-MCNC: 77 MG/DL
TSH SERPL DL<=0.005 MIU/L-ACNC: 1.13 UIU/ML (ref 0.3–4.2)

## 2025-05-23 PROCEDURE — 82570 ASSAY OF URINE CREATININE: CPT | Performed by: STUDENT IN AN ORGANIZED HEALTH CARE EDUCATION/TRAINING PROGRAM

## 2025-05-23 PROCEDURE — 83036 HEMOGLOBIN GLYCOSYLATED A1C: CPT | Performed by: STUDENT IN AN ORGANIZED HEALTH CARE EDUCATION/TRAINING PROGRAM

## 2025-05-23 PROCEDURE — 36415 COLL VENOUS BLD VENIPUNCTURE: CPT | Performed by: STUDENT IN AN ORGANIZED HEALTH CARE EDUCATION/TRAINING PROGRAM

## 2025-05-23 PROCEDURE — 84443 ASSAY THYROID STIM HORMONE: CPT | Performed by: STUDENT IN AN ORGANIZED HEALTH CARE EDUCATION/TRAINING PROGRAM

## 2025-05-23 PROCEDURE — 82306 VITAMIN D 25 HYDROXY: CPT | Performed by: STUDENT IN AN ORGANIZED HEALTH CARE EDUCATION/TRAINING PROGRAM

## 2025-05-23 PROCEDURE — 82043 UR ALBUMIN QUANTITATIVE: CPT | Performed by: STUDENT IN AN ORGANIZED HEALTH CARE EDUCATION/TRAINING PROGRAM

## 2025-05-23 PROCEDURE — 80061 LIPID PANEL: CPT | Performed by: STUDENT IN AN ORGANIZED HEALTH CARE EDUCATION/TRAINING PROGRAM

## 2025-05-23 RX ORDER — AMLODIPINE BESYLATE 5 MG/1
5 TABLET ORAL DAILY
Qty: 30 TABLET | Refills: 1 | Status: SHIPPED | OUTPATIENT
Start: 2025-05-23

## 2025-05-23 RX ORDER — VENLAFAXINE HYDROCHLORIDE 75 MG/1
75 CAPSULE, EXTENDED RELEASE ORAL DAILY
Qty: 90 CAPSULE | Refills: 1 | Status: SHIPPED | OUTPATIENT
Start: 2025-05-23

## 2025-05-23 ASSESSMENT — PAIN SCALES - GENERAL: PAINLEVEL_OUTOF10: SEVERE PAIN (9)

## 2025-05-23 NOTE — PROGRESS NOTES
Preventive Care Visit  [facility]  Liv Ferrera MD, Family Medicine  May 23, 2025      Assessment & Plan       Routine history and physical examination of adult  Nhung presents to clinic for her annual physical  She is due for fasting labs  She is due for colonoscopy but she declines, she is OK with cologard  She is UTD on PAP  She is due for DEXA scan  Due for next mammogram in September  She is UTD on her vaccinations.    She defers lung cancer screening to later time  - TSH with free T4 reflex; Future  - Hemoglobin A1c; Future  - Lipid Profile (Chol, Trig, HDL, LDL calc); Future  - COLOGUARD([employer]); Future    Generalized anxiety disorder  Venlafaxine 75 mg daily is working well for her.  She is tolerating the dose well with no reported side effects  We will continue the same dose for now.  - venlafaxine (EFFEXOR XR) 75 MG 24 hr capsule; Take 1 capsule (75 mg) by mouth daily.    Essential hypertension  BP is much better but still would like her closer to 120/80  We will increase amlodipine from 2.5 mg daily to 5 mg daily   Continue DASH diet  Recommend mediterranean style diet  Strongly emphasized smoking cessation  - Lipid Profile (Chol, Trig, HDL, LDL calc); Future  - Albumin Random Urine Quantitative with Creat Ratio; Future  - amLODIPine (NORVASC) 5 MG tablet; Take 1 tablet (5 mg) by mouth daily.    Morbid obesity (H)  Excellent weight loss.  Down from 235 to 201 pounds  She is tolerating medication well with no r  - tirzepatide-Weight Management (ZEPBOUND) 10 MG/0.5ML prefilled pen; Inject 0.5 mLs (10 mg) subcutaneously every 7 days.    Cigarette nicotine dependence without complication  On nicotine patches.  She is working on cutting back    Chronic pain of left knee  Bilateral knee OA.  She has had knee injections in the past which were effective for quite some time.  We will schedule for knee injections    Hyperlipidemia LDL goal <100  The 10-year ASCVD risk score (Luc DK, et  "al., 2019) is: 9.4%    Values used to calculate the score:      Age: 59 years      Sex: Female      Is Non- : No      Diabetic: No      Tobacco smoker: Yes      Systolic Blood Pressure: 138 mmHg      Is BP treated: Yes      HDL Cholesterol: 52 mg/dL      Total Cholesterol: 144 mg/dL  On rosuvastatin 5 mg and tolerating this well  We will update fasting labs  - Lipid Profile (Chol, Trig, HDL, LDL calc); Future    Bilateral lower extremity edema  Recommend compressions knee high.  Elevate legs as much as possible.  Weight loss will help.  Sodium restriction.  Avoid prolonged standing.  Recommend exercise to promote good circulation.     Vitamin D deficiency  History of vitamin D deficiency  - Vitamin D Deficiency; Future    Prediabetes  Significant weight loss.  -34 pounds with     Screening for malignant neoplasm of colon  Due for colonoscopy but she declines  OK with cologard.  Reviewed that this is generally not as accurate and if positive, she will need a follow-up colonoscopy      BMI  Estimated body mass index is 35.66 kg/m  as calculated from the following:    Height as of 4/4/25: 1.6 m (5' 3\").    Weight as of this encounter: 91.3 kg (201 lb 4.8 oz).           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    Coming in to clinic this morning, felt nervous, like she could jump out of her skin.    She can go to job just fine, meeting new people is fine.       Hyperlipidemia Follow-Up    Are you regularly taking any medication or supplement to lower your cholesterol?   Yes- crestor 5 mg  Are you having muscle aches or other side effects that you think could be caused by your " cholesterol lowering medication?  No    Hypertension Follow-up    Do you check your blood pressure regularly outside of the clinic? Yes   Are you following a low salt diet? Yes  Are your blood pressures ever more than 140 on the top number (systolic) OR more   than 90 on the bottom number (diastolic), for example 140/90? Yes    BP Readings from Last 2 Encounters:   05/23/25 138/88   04/04/25 (!) 154/88     Weight Management    Duration: starting weight 239lb, January 2025  Description (location/character/radiation): 12.5 zepbound started Wed, increased nausea, woud like to try the 10 mg.  Intensity:  moderate          Advance Care Planning    Discussed advance care planning with patient; however, patient declined at this time.        5/17/2024   General Health   How would you rate your overall physical health? (!) FAIR   Feel stress (tense, anxious, or unable to sleep) Rather much         5/17/2024   Nutrition   Three or more servings of calcium each day? Yes   Diet: I don't know   How many servings of fruit and vegetables per day? (!) 2-3   How many sweetened beverages each day? 0-1         5/17/2024   Exercise   Days per week of moderate/strenous exercise 0 days         5/17/2024   Social Factors   Frequency of gathering with friends or relatives Once a week   Worry food won't last until get money to buy more No   Food not last or not have enough money for food? No   Do you have housing? (Housing is defined as stable permanent housing and does not include staying outside in a car, in a tent, in an abandoned building, in an overnight shelter, or couch-surfing.) Yes   Are you worried about losing your housing? No   Lack of transportation? No   Unable to get utilities (heat,electricity)? No         5/17/2024   Dental   Dentist two times every year? (!) DECLINE         Today's PHQ-2 Score:       1/10/2025    10:02 AM   PHQ-2 ( 1999 [employer])   Q1: Little interest or pleasure in doing things 2    Q2: Feeling down,  depressed or hopeless 0    PHQ-2 Score 2    Q1: Little interest or pleasure in doing things More than half the days   Q2: Feeling down, depressed or hopeless Not at all   PHQ-2 Score 2       Proxy-reported         2/7/2025   Substance Use   If I could quit smoking, I would Completely agree   I want to quit somking, worry about health affects Completely disagree   Willing to make a plan to quit smoking Completely agree   Willing to cut down before quitting Completely agree     Social History     Tobacco Use    Smoking status: Every Day     Current packs/day: 1.00     Average packs/day: 1 pack/day for 31.2 years (31.2 ttl pk-yrs)     Types: Cigarettes     Start date: 3/7/1994     Passive exposure: Current    Smokeless tobacco: Never   Vaping Use    Vaping status: Never Used   Substance Use Topics    Alcohol use: Yes     Comment: monthly    Drug use: Never           9/6/2024   LAST FHS-7 RESULTS   1st degree relative breast or ovarian cancer No   Any relative bilateral breast cancer No   Any male have breast cancer No   Any ONE woman have BOTH breast AND ovarian cancer No   Any woman with breast cancer before 50yrs No   2 or more relatives with breast AND/OR ovarian cancer No   2 or more relatives with breast AND/OR bowel cancer No     Mammogram Screening - Mammogram every 1-2 years updated in Health Maintenance based on mutual decision making      History of abnormal Pap smear: No - age 30- 64 PAP with HPV every 5 years recommended        Latest Ref Rng & Units 5/17/2024     9:39 AM 5/17/2024     8:13 AM   PAP / HPV   PAP  Negative for Intraepithelial Lesion or Malignancy (NILM)     HPV 16 DNA Negative  Negative    HPV 18 DNA Negative  Negative    Other HR HPV Negative  Negative      ASCVD Risk   The 10-year ASCVD risk score (Luc HURTADO, et al., 2019) is: 11.9%    Values used to calculate the score:      Age: 59 years      Sex: Female      Is Non- : No      Diabetic: No      Tobacco  "smoker: Yes      Systolic Blood Pressure: 138 mmHg      Is BP treated: Yes      HDL Cholesterol: 55 mg/dL      Total Cholesterol: 212 mg/dL    Reviewed and updated as needed this visit by Provider                    History reviewed. No pertinent past medical history.  History reviewed. No pertinent surgical history.  OB History   No obstetric history on file.         Review of Systems  Constitutional, HEENT, cardiovascular, pulmonary, GI, , musculoskeletal, neuro, skin, endocrine and psych systems are negative, except as otherwise noted.     Objective    Exam  /88 (BP Location: Left arm, Patient Position: Sitting, Cuff Size: Adult Large)   Pulse 97   Temp 97.2  F (36.2  C) (Tympanic)   Wt 91.3 kg (201 lb 4.8 oz)   SpO2 98%   BMI 35.66 kg/m     Estimated body mass index is 35.66 kg/m  as calculated from the following:    Height as of 4/4/25: 1.6 m (5' 3\").    Weight as of this encounter: 91.3 kg (201 lb 4.8 oz).    Physical Exam  GENERAL: alert and no distress  EYES: Eyes grossly normal to inspection, PERRL and conjunctivae and sclerae normal  HENT: ear canals and TM's normal, nose and mouth without ulcers or lesions  NECK: no adenopathy, no asymmetry, masses, or scars  RESP: lungs clear to auscultation - no rales, rhonchi or wheezes  BREAST: normal without masses, tenderness or nipple discharge and no palpable axillary masses or adenopathy  CV: regular rate and rhythm, normal S1 S2, no S3 or S4, no murmur, click or rub, no peripheral edema  ABDOMEN: soft, nontender, no hepatosplenomegaly, no masses and bowel sounds normal   (female): normal female external genitalia, normal urethral meatus, normal vaginal mucosa  MS: no gross musculoskeletal defects noted, no edema  SKIN: no suspicious lesions or rashes  NEURO: Normal strength and tone, mentation intact and speech normal  PSYCH: mentation appears normal, affect normal/bright        Signed Electronically by: Liv Ferrera MD    "

## 2025-05-24 LAB — VIT D+METAB SERPL-MCNC: 62 NG/ML (ref 20–50)

## 2025-05-27 DIAGNOSIS — M25.562 CHRONIC PAIN OF LEFT KNEE: ICD-10-CM

## 2025-05-27 DIAGNOSIS — G89.29 CHRONIC PAIN OF LEFT KNEE: ICD-10-CM

## 2025-05-28 RX ORDER — GABAPENTIN 300 MG/1
300 CAPSULE ORAL 3 TIMES DAILY
Qty: 120 CAPSULE | Refills: 0 | Status: SHIPPED | OUTPATIENT
Start: 2025-05-28

## 2025-05-28 NOTE — TELEPHONE ENCOUNTER
gabapentin (NEURONTIN) 300 MG capsule       Last Written Prescription Date:  4/25/25  Last Fill Quantity: 120,   # refills: 0  Last Office Visit: 5/23/25  Future Office visit:    Next 5 appointments (look out 90 days)      Jun 13, 2025 11:30 AM  (Arrive by 11:15 AM)  Provider Visit with Liv Ferrera MD  [facility] [place] ([facility] [place] ) [address]  [place] [address]  [phone]             Routing refill request to provider for review/approval because:

## 2025-06-05 ENCOUNTER — PATIENT OUTREACH (OUTPATIENT)
Dept: CARE COORDINATION | Facility: CLINIC | Age: 60
End: 2025-06-05

## 2025-06-16 DIAGNOSIS — I10 ESSENTIAL HYPERTENSION: ICD-10-CM

## 2025-06-16 NOTE — TELEPHONE ENCOUNTER
Zestoretic      Last Written Prescription Date:  5/14/25  Last Fill Quantity: 30,   # refills: 0  Last Office Visit: 6/13/25  Future Office visit:    Next 5 appointments (look out 90 days)      Sep 12, 2025 9:00 AM  (Arrive by 8:45 AM)  Provider Visit with Liv Ferrera MD  [facility] [place] ([facility] [place] ) [address]  [place] [address]  [phone]             Routing refill request to provider for review/approval because:

## 2025-06-17 RX ORDER — LISINOPRIL AND HYDROCHLOROTHIAZIDE 20; 25 MG/1; MG/1
1 TABLET ORAL DAILY
Qty: 30 TABLET | Refills: 3 | Status: SHIPPED | OUTPATIENT
Start: 2025-06-17

## 2025-06-23 ENCOUNTER — HOSPITAL ENCOUNTER (OUTPATIENT)
Dept: BONE DENSITY | Facility: HOSPITAL | Age: 60
Discharge: HOME | End: 2025-06-23
Attending: STUDENT IN AN ORGANIZED HEALTH CARE EDUCATION/TRAINING PROGRAM | Admitting: RADIOLOGY
Payer: COMMERCIAL

## 2025-06-23 DIAGNOSIS — M25.562 CHRONIC PAIN OF LEFT KNEE: ICD-10-CM

## 2025-06-23 DIAGNOSIS — Z78.0 POST-MENOPAUSE: ICD-10-CM

## 2025-06-23 DIAGNOSIS — G89.29 CHRONIC PAIN OF LEFT KNEE: ICD-10-CM

## 2025-06-23 PROCEDURE — 77080 DXA BONE DENSITY AXIAL: CPT | Mod: 26 | Performed by: RADIOLOGY

## 2025-06-23 PROCEDURE — 77080 DXA BONE DENSITY AXIAL: CPT

## 2025-06-24 RX ORDER — GABAPENTIN 300 MG/1
300 CAPSULE ORAL 3 TIMES DAILY
Qty: 120 CAPSULE | Refills: 0 | Status: SHIPPED | OUTPATIENT
Start: 2025-06-24

## 2025-06-24 NOTE — TELEPHONE ENCOUNTER
Gabapentin  Last Written Prescription Date: 5/28/25  Last Fill Quantity: 120 # of Refills: 0  Last Office Visit: 6/13/25

## 2025-07-18 DIAGNOSIS — I10 ESSENTIAL HYPERTENSION: ICD-10-CM

## 2025-07-21 RX ORDER — AMLODIPINE BESYLATE 5 MG/1
5 TABLET ORAL DAILY
Qty: 30 TABLET | Refills: 9 | Status: SHIPPED | OUTPATIENT
Start: 2025-07-21

## 2025-07-28 DIAGNOSIS — G89.29 CHRONIC PAIN OF LEFT KNEE: ICD-10-CM

## 2025-07-28 DIAGNOSIS — L73.2 HIDRADENITIS SUPPURATIVA: ICD-10-CM

## 2025-07-28 DIAGNOSIS — M25.562 CHRONIC PAIN OF LEFT KNEE: ICD-10-CM

## 2025-07-28 NOTE — TELEPHONE ENCOUNTER
Gabapentin      Last Written Prescription Date:  6/24/25  Last Fill Quantity: 120,   # refills: 0  Last Office Visit: 6/13/25  Future Office visit:    Next 5 appointments (look out 90 days)      Sep 12, 2025 9:00 AM  (Arrive by 8:45 AM)  Provider Visit with Liv Ferrera MD  [facility] ) [address] [address]  [phone]             Routing refill request to provider for review/approval because:      Clindamycin      Last Written Prescription Date:  5/17/24  Last Fill Quantity: 60g,   # refills: 1  Last Office Visit: 6/13/25  Future Office visit:    Next 5 appointments (look out 90 days)      Sep 12, 2025 9:00 AM  (Arrive by 8:45 AM)  Provider Visit with Liv Ferrera MD  [facility] ) [address] [address]  [phone]             Routing refill request to provider for review/approval because:  Drug not active on patient's medication list

## 2025-07-29 RX ORDER — CLINDAMYCIN PHOSPHATE 10 MG/G
GEL TOPICAL
Qty: 60 G | Refills: 0 | OUTPATIENT
Start: 2025-07-29

## 2025-07-29 RX ORDER — GABAPENTIN 300 MG/1
300 CAPSULE ORAL 3 TIMES DAILY
Qty: 120 CAPSULE | Refills: 3 | Status: SHIPPED | OUTPATIENT
Start: 2025-07-29

## 2025-08-08 DIAGNOSIS — L73.2 HIDRADENITIS SUPPURATIVA: ICD-10-CM

## 2025-08-11 RX ORDER — CLINDAMYCIN PHOSPHATE 10 MG/G
GEL TOPICAL
Qty: 60 G | Refills: 0 | Status: SHIPPED | OUTPATIENT
Start: 2025-08-11